# Patient Record
Sex: FEMALE | Race: WHITE | NOT HISPANIC OR LATINO | URBAN - METROPOLITAN AREA
[De-identification: names, ages, dates, MRNs, and addresses within clinical notes are randomized per-mention and may not be internally consistent; named-entity substitution may affect disease eponyms.]

---

## 2017-02-17 ENCOUNTER — EMERGENCY (EMERGENCY)
Facility: HOSPITAL | Age: 42
LOS: 1 days | Discharge: HOME | End: 2017-02-17
Attending: EMERGENCY MEDICINE | Admitting: EMERGENCY MEDICINE
Payer: COMMERCIAL

## 2017-02-17 VITALS
DIASTOLIC BLOOD PRESSURE: 74 MMHG | TEMPERATURE: 98 F | WEIGHT: 149.91 LBS | SYSTOLIC BLOOD PRESSURE: 112 MMHG | OXYGEN SATURATION: 97 % | RESPIRATION RATE: 18 BRPM | HEART RATE: 70 BPM | HEIGHT: 64 IN

## 2017-02-17 LAB
APPEARANCE UR: CLEAR — SIGNIFICANT CHANGE UP
BACTERIA # UR AUTO: ABNORMAL
BILIRUB UR-MCNC: NEGATIVE — SIGNIFICANT CHANGE UP
COLOR SPEC: YELLOW — SIGNIFICANT CHANGE UP
DIFF PNL FLD: ABNORMAL
EPI CELLS # UR: SIGNIFICANT CHANGE UP
GLUCOSE UR QL: NEGATIVE MG/DL — SIGNIFICANT CHANGE UP
HCG UR QL: NEGATIVE — SIGNIFICANT CHANGE UP
KETONES UR-MCNC: NEGATIVE — SIGNIFICANT CHANGE UP
LEUKOCYTE ESTERASE UR-ACNC: ABNORMAL
NITRITE UR-MCNC: POSITIVE
PH UR: 5 — SIGNIFICANT CHANGE UP (ref 4.8–8)
PROT UR-MCNC: 30 MG/DL
RBC CASTS # UR COMP ASSIST: ABNORMAL /HPF (ref 0–4)
SP GR SPEC: 1.02 — SIGNIFICANT CHANGE UP (ref 1.01–1.02)
UROBILINOGEN FLD QL: NEGATIVE MG/DL — SIGNIFICANT CHANGE UP
WBC UR QL: ABNORMAL

## 2017-02-17 PROCEDURE — 87086 URINE CULTURE/COLONY COUNT: CPT

## 2017-02-17 PROCEDURE — 81025 URINE PREGNANCY TEST: CPT

## 2017-02-17 PROCEDURE — 99283 EMERGENCY DEPT VISIT LOW MDM: CPT

## 2017-02-17 PROCEDURE — 81001 URINALYSIS AUTO W/SCOPE: CPT

## 2017-02-17 PROCEDURE — 87186 SC STD MICRODIL/AGAR DIL: CPT

## 2017-02-17 RX ORDER — ONDANSETRON 8 MG/1
1 TABLET, FILM COATED ORAL
Qty: 12 | Refills: 0 | OUTPATIENT
Start: 2017-02-17 | End: 2017-02-21

## 2017-02-17 RX ORDER — ONDANSETRON 8 MG/1
4 TABLET, FILM COATED ORAL ONCE
Qty: 0 | Refills: 0 | Status: COMPLETED | OUTPATIENT
Start: 2017-02-17 | End: 2017-02-17

## 2017-02-17 RX ORDER — CEPHALEXIN 500 MG
1 CAPSULE ORAL
Qty: 40 | Refills: 0 | OUTPATIENT
Start: 2017-02-17 | End: 2017-02-27

## 2017-02-17 RX ORDER — CEPHALEXIN 500 MG
500 CAPSULE ORAL ONCE
Qty: 0 | Refills: 0 | Status: COMPLETED | OUTPATIENT
Start: 2017-02-17 | End: 2017-02-17

## 2017-02-17 RX ORDER — IBUPROFEN 200 MG
600 TABLET ORAL ONCE
Qty: 0 | Refills: 0 | Status: DISCONTINUED | OUTPATIENT
Start: 2017-02-17 | End: 2017-02-17

## 2017-02-17 RX ORDER — IBUPROFEN 200 MG
400 TABLET ORAL ONCE
Qty: 0 | Refills: 0 | Status: COMPLETED | OUTPATIENT
Start: 2017-02-17 | End: 2017-02-17

## 2017-02-17 RX ORDER — PHENAZOPYRIDINE HCL 100 MG
100 TABLET ORAL ONCE
Qty: 0 | Refills: 0 | Status: COMPLETED | OUTPATIENT
Start: 2017-02-17 | End: 2017-02-17

## 2017-02-17 RX ADMIN — ONDANSETRON 4 MILLIGRAM(S): 8 TABLET, FILM COATED ORAL at 13:01

## 2017-02-17 RX ADMIN — Medication 400 MILLIGRAM(S): at 12:07

## 2017-02-17 RX ADMIN — Medication 400 MILLIGRAM(S): at 11:37

## 2017-02-17 RX ADMIN — Medication 100 MILLIGRAM(S): at 11:37

## 2017-02-17 RX ADMIN — Medication 500 MILLIGRAM(S): at 13:01

## 2017-02-17 NOTE — ED STATDOCS - NS ED MD SCRIBE ATTENDING SCRIBE SECTIONS
HISTORY OF PRESENT ILLNESS/REVIEW OF SYSTEMS/INTAKE ASSESSMENT/SCREENINGS/DISPOSITION/PAST MEDICAL/SURGICAL/SOCIAL HISTORY/PHYSICAL EXAM/HIV

## 2017-02-17 NOTE — ED STATDOCS - PROGRESS NOTE DETAILS
pt c/o left sided flank pain and nausea - denies vomiting - able to tolerate PO - pt also c/o difficulty breathing - pt is PERC negative  General: well appearing, NAD  +CVA tenderness, lungs CTA, Neuro: A&O x 3, no focal weaknesses  will treat for pyelo - advised pt to return to ED if unable to tolerate PO

## 2017-02-17 NOTE — ED STATDOCS - ATTENDING CONTRIBUTION TO CARE
I, Carline Schultz, performed the initial face to face bedside interview with this patient regarding history of present illness, review of symptoms and relevant past medical, social and family history.  I completed an independent physical examination.  I was the initial provider who evaluated this patient. I have signed out the follow up of any pending tests (i.e. labs, radiological studies) to the ACP.  I have communicated the patient’s plan of care and disposition with the ACP.  The history, relevant review of systems, past medical and surgical history, medical decision making, and physical examination was documented by the scribe in my presence and I attest to the accuracy of the documentation.

## 2017-02-17 NOTE — ED STATDOCS - OBJECTIVE STATEMENT
40 y/o F presents to the ED c/o fever, chills, nausea, cloudy urine, increased frequency, and left flank pain radiating to mid-back. Pt denies hematuria, vomiting,   Pt denies tobacco use. NKDA. LMP was a couple of days ago. No further complaints at this time.

## 2017-02-18 ENCOUNTER — EMERGENCY (EMERGENCY)
Facility: HOSPITAL | Age: 42
LOS: 0 days | Discharge: HOME | End: 2017-02-18
Attending: EMERGENCY MEDICINE | Admitting: EMERGENCY MEDICINE
Payer: MEDICAID

## 2017-02-18 VITALS
DIASTOLIC BLOOD PRESSURE: 104 MMHG | RESPIRATION RATE: 16 BRPM | SYSTOLIC BLOOD PRESSURE: 118 MMHG | TEMPERATURE: 99 F | OXYGEN SATURATION: 99 % | HEART RATE: 71 BPM

## 2017-02-18 VITALS — WEIGHT: 149.91 LBS

## 2017-02-18 DIAGNOSIS — J02.9 ACUTE PHARYNGITIS, UNSPECIFIED: ICD-10-CM

## 2017-02-18 DIAGNOSIS — R50.9 FEVER, UNSPECIFIED: ICD-10-CM

## 2017-02-18 DIAGNOSIS — R13.10 DYSPHAGIA, UNSPECIFIED: ICD-10-CM

## 2017-02-18 PROCEDURE — 99283 EMERGENCY DEPT VISIT LOW MDM: CPT

## 2017-02-18 RX ORDER — DEXAMETHASONE 0.5 MG/5ML
10 ELIXIR ORAL ONCE
Refills: 0 | Status: DISCONTINUED | OUTPATIENT
Start: 2017-02-18 | End: 2017-02-18

## 2017-02-18 RX ORDER — IBUPROFEN 200 MG
600 TABLET ORAL ONCE
Refills: 0 | Status: DISCONTINUED | OUTPATIENT
Start: 2017-02-18 | End: 2017-02-18

## 2017-02-18 NOTE — ED STATDOCS - NS ED MD SCRIBE ATTENDING SCRIBE SECTIONS
HISTORY OF PRESENT ILLNESS/REVIEW OF SYSTEMS/DISPOSITION/PAST MEDICAL/SURGICAL/SOCIAL HISTORY/VITAL SIGNS( Pullset)/PHYSICAL EXAM

## 2017-02-18 NOTE — ED STATDOCS - OBJECTIVE STATEMENT
40 y/o F presents to ED for evaluation of sore throat. Pt states she woke up this morning with a sore throat. She also reports an intermittent fever, chills. Pt also reports she is having difficulty swallowing which prompted her to seek evaluation. She states the sore throat has made it difficult to swallow. Pt denies n/v/d, HA, CP.

## 2017-02-18 NOTE — ED STATDOCS - ATTENDING CONTRIBUTION TO CARE
I, Kwesi Andrea, performed the initial face to face bedside interview with this patient regarding history of present illness, review of symptoms and relevant past medical, social and family history.  I completed an independent physical examination.  I was the initial provider who evaluated this patient. I have signed out the follow up of any pending tests (i.e. labs, radiological studies) to the ACP.  I have communicated the patient’s plan of care and disposition with the ACP.  The history, relevant review of systems, past medical and surgical history, medical decision making, and physical examination was documented by the scribe in my presence and I attest to the accuracy of the documentation.

## 2017-02-18 NOTE — ED STATDOCS - PROGRESS NOTE DETAILS
patient verbally threatening and abusive from initial evaluation; patient screaming  patient in no distress; breathing comfortably; no drooling no signs of distress, throat is clear. Patient is already on Keflex. Patient was seen by charge nurse Aakash. Will give one dose of decadron IM. patient verbally threatening and abusive from initial evaluation; patient screaming  patient in no distress; breathing comfortably; no drooling (Dr. Andrea)

## 2017-04-14 ENCOUNTER — EMERGENCY (EMERGENCY)
Facility: HOSPITAL | Age: 42
LOS: 0 days | Discharge: HOME | End: 2017-04-14
Attending: EMERGENCY MEDICINE | Admitting: EMERGENCY MEDICINE
Payer: MEDICAID

## 2017-04-14 VITALS
TEMPERATURE: 100 F | OXYGEN SATURATION: 100 % | HEART RATE: 92 BPM | RESPIRATION RATE: 16 BRPM | WEIGHT: 149.91 LBS | HEIGHT: 63 IN | DIASTOLIC BLOOD PRESSURE: 75 MMHG | SYSTOLIC BLOOD PRESSURE: 130 MMHG

## 2017-04-14 VITALS
HEART RATE: 89 BPM | TEMPERATURE: 98 F | RESPIRATION RATE: 17 BRPM | SYSTOLIC BLOOD PRESSURE: 97 MMHG | OXYGEN SATURATION: 100 % | DIASTOLIC BLOOD PRESSURE: 43 MMHG

## 2017-04-14 DIAGNOSIS — N12 TUBULO-INTERSTITIAL NEPHRITIS, NOT SPECIFIED AS ACUTE OR CHRONIC: ICD-10-CM

## 2017-04-14 DIAGNOSIS — R10.9 UNSPECIFIED ABDOMINAL PAIN: ICD-10-CM

## 2017-04-14 LAB
ALBUMIN SERPL ELPH-MCNC: 3.4 G/DL — SIGNIFICANT CHANGE UP (ref 3.3–5)
ALP SERPL-CCNC: 59 U/L — SIGNIFICANT CHANGE UP (ref 40–120)
ALT FLD-CCNC: 14 U/L — SIGNIFICANT CHANGE UP (ref 12–78)
ANION GAP SERPL CALC-SCNC: 9 MMOL/L — SIGNIFICANT CHANGE UP (ref 5–17)
APPEARANCE UR: CLEAR — SIGNIFICANT CHANGE UP
AST SERPL-CCNC: 8 U/L — LOW (ref 15–37)
BACTERIA # UR AUTO: (no result)
BASOPHILS # BLD AUTO: 0.1 K/UL — SIGNIFICANT CHANGE UP (ref 0–0.2)
BASOPHILS NFR BLD AUTO: 0.8 % — SIGNIFICANT CHANGE UP (ref 0–2)
BILIRUB SERPL-MCNC: 0.6 MG/DL — SIGNIFICANT CHANGE UP (ref 0.2–1.2)
BILIRUB UR-MCNC: NEGATIVE — SIGNIFICANT CHANGE UP
BUN SERPL-MCNC: 14 MG/DL — SIGNIFICANT CHANGE UP (ref 7–23)
CALCIUM SERPL-MCNC: 8.9 MG/DL — SIGNIFICANT CHANGE UP (ref 8.5–10.1)
CHLORIDE SERPL-SCNC: 102 MMOL/L — SIGNIFICANT CHANGE UP (ref 96–108)
CO2 SERPL-SCNC: 25 MMOL/L — SIGNIFICANT CHANGE UP (ref 22–31)
COLOR SPEC: YELLOW — SIGNIFICANT CHANGE UP
CREAT SERPL-MCNC: 1.06 MG/DL — SIGNIFICANT CHANGE UP (ref 0.5–1.3)
DIFF PNL FLD: (no result)
EOSINOPHIL # BLD AUTO: 0 K/UL — SIGNIFICANT CHANGE UP (ref 0–0.5)
EOSINOPHIL NFR BLD AUTO: 0.2 % — SIGNIFICANT CHANGE UP (ref 0–6)
EPI CELLS # UR: SIGNIFICANT CHANGE UP
GLUCOSE SERPL-MCNC: 125 MG/DL — HIGH (ref 70–99)
GLUCOSE UR QL: NEGATIVE MG/DL — SIGNIFICANT CHANGE UP
HCT VFR BLD CALC: 39 % — SIGNIFICANT CHANGE UP (ref 34.5–45)
HGB BLD-MCNC: 13.2 G/DL — SIGNIFICANT CHANGE UP (ref 11.5–15.5)
KETONES UR-MCNC: (no result)
LEUKOCYTE ESTERASE UR-ACNC: (no result)
LYMPHOCYTES # BLD AUTO: 1.8 K/UL — SIGNIFICANT CHANGE UP (ref 1–3.3)
LYMPHOCYTES # BLD AUTO: 15.2 % — SIGNIFICANT CHANGE UP (ref 13–44)
MCHC RBC-ENTMCNC: 26.7 PG — LOW (ref 27–34)
MCHC RBC-ENTMCNC: 34 GM/DL — SIGNIFICANT CHANGE UP (ref 32–36)
MCV RBC AUTO: 78.6 FL — LOW (ref 80–100)
MONOCYTES # BLD AUTO: 0.8 K/UL — SIGNIFICANT CHANGE UP (ref 0–0.9)
MONOCYTES NFR BLD AUTO: 7.2 % — SIGNIFICANT CHANGE UP (ref 2–14)
NEUTROPHILS # BLD AUTO: 9.1 K/UL — HIGH (ref 1.8–7.4)
NEUTROPHILS NFR BLD AUTO: 76.6 % — SIGNIFICANT CHANGE UP (ref 43–77)
NITRITE UR-MCNC: NEGATIVE — SIGNIFICANT CHANGE UP
PH UR: 6.5 — SIGNIFICANT CHANGE UP (ref 4.8–8)
PLATELET # BLD AUTO: 178 K/UL — SIGNIFICANT CHANGE UP (ref 150–400)
POTASSIUM SERPL-MCNC: 3.8 MMOL/L — SIGNIFICANT CHANGE UP (ref 3.5–5.3)
POTASSIUM SERPL-SCNC: 3.8 MMOL/L — SIGNIFICANT CHANGE UP (ref 3.5–5.3)
PROT SERPL-MCNC: 7.3 GM/DL — SIGNIFICANT CHANGE UP (ref 6–8.3)
PROT UR-MCNC: 30 MG/DL
RBC # BLD: 4.96 M/UL — SIGNIFICANT CHANGE UP (ref 3.8–5.2)
RBC # FLD: 12.3 % — SIGNIFICANT CHANGE UP (ref 10.3–14.5)
RBC CASTS # UR COMP ASSIST: (no result) /HPF (ref 0–4)
SODIUM SERPL-SCNC: 136 MMOL/L — SIGNIFICANT CHANGE UP (ref 135–145)
SP GR SPEC: 1.01 — SIGNIFICANT CHANGE UP (ref 1.01–1.02)
UROBILINOGEN FLD QL: NEGATIVE MG/DL — SIGNIFICANT CHANGE UP
WBC # BLD: 11.8 K/UL — HIGH (ref 3.8–10.5)
WBC # FLD AUTO: 11.8 K/UL — HIGH (ref 3.8–10.5)
WBC UR QL: (no result)

## 2017-04-14 PROCEDURE — 99283 EMERGENCY DEPT VISIT LOW MDM: CPT

## 2017-04-14 RX ORDER — KETOROLAC TROMETHAMINE 30 MG/ML
30 SYRINGE (ML) INJECTION ONCE
Refills: 0 | Status: DISCONTINUED | OUTPATIENT
Start: 2017-04-14 | End: 2017-04-14

## 2017-04-14 RX ORDER — MOXIFLOXACIN HYDROCHLORIDE TABLETS, 400 MG 400 MG/1
1 TABLET, FILM COATED ORAL
Qty: 20 | Refills: 0
Start: 2017-04-14 | End: 2017-04-24

## 2017-04-14 RX ORDER — ONDANSETRON 8 MG/1
4 TABLET, FILM COATED ORAL ONCE
Refills: 0 | Status: COMPLETED | OUTPATIENT
Start: 2017-04-14 | End: 2017-04-14

## 2017-04-14 RX ORDER — CIPROFLOXACIN LACTATE 400MG/40ML
500 VIAL (ML) INTRAVENOUS ONCE
Refills: 0 | Status: COMPLETED | OUTPATIENT
Start: 2017-04-14 | End: 2017-04-14

## 2017-04-14 RX ORDER — SODIUM CHLORIDE 9 MG/ML
1000 INJECTION INTRAMUSCULAR; INTRAVENOUS; SUBCUTANEOUS ONCE
Refills: 0 | Status: COMPLETED | OUTPATIENT
Start: 2017-04-14 | End: 2017-04-14

## 2017-04-14 RX ADMIN — Medication 30 MILLIGRAM(S): at 19:40

## 2017-04-14 RX ADMIN — SODIUM CHLORIDE 1000 MILLILITER(S): 9 INJECTION INTRAMUSCULAR; INTRAVENOUS; SUBCUTANEOUS at 20:34

## 2017-04-14 RX ADMIN — ONDANSETRON 4 MILLIGRAM(S): 8 TABLET, FILM COATED ORAL at 19:40

## 2017-04-14 RX ADMIN — SODIUM CHLORIDE 2000 MILLILITER(S): 9 INJECTION INTRAMUSCULAR; INTRAVENOUS; SUBCUTANEOUS at 19:40

## 2017-04-14 RX ADMIN — Medication 500 MILLIGRAM(S): at 21:59

## 2017-04-14 NOTE — ED STATDOCS - MEDICAL DECISION MAKING DETAILS
42 y/o F w flank pain, fever, cloudy urine, nausea without vomiting. +CVAT. Labs, IVF, pain control, reassess. Symptoms likely  pyelo. If patient symptoms improve and able to tolerate PO- will dc home.

## 2017-04-14 NOTE — ED PROVIDER NOTE - OBJECTIVE STATEMENT
41F presents with left flank pain. Pain started a few days ago. left flank radiating to groin. + fevers to 101. nausea no vomiting. + cloudy urine. + headache. has had this before and it way pyelonephritis. patient cannot get comfortable. Now pain 8/10. didn't take anything for the pain

## 2017-04-14 NOTE — ED PROVIDER NOTE - NS ED ROS FT
Constitutional: + fever or chills  Eyes: No visual changes  HEENT: No throat pain  CV: No chest pain  Resp: No SOB no cough  GI: + abd pain, + nausea no vomiting  : + dysuria  MSK: + back pain  Skin: No rash  Neuro: + headache

## 2017-04-14 NOTE — ED STATDOCS - ATTENDING CONTRIBUTION TO CARE
I have seen and examined patient with resident. Agree with plan. I personally saw the patient with the NP, and completed the key components of the history and physical exam. I then discussed the management plan with the NP. I personally saw the patient with the NP, and completed the key components of the history and physical exam. I then discussed the management plan with the NP.  40 y/o F presenting w flank pain. appears moderately uncomfortable, b/l cvat, soft abdomen. labs, ua, abx as needed. reasess for admission

## 2017-04-14 NOTE — ED ADULT NURSE NOTE - CHPI ED SYMPTOMS NEG
no nausea/no weakness/no decreased eating/drinking/no chills/no numbness/no dizziness/no tingling/no vomiting

## 2017-04-14 NOTE — ED PROVIDER NOTE - PHYSICAL EXAMINATION
Constitutional: moderate distress  Eyes: PERRLA EOMI  Head: Normocephalic atraumatic  Cardiac: regular rate   Resp: Lungs CTAB  GI: ttp in llq severe left cvat  Neuro: CN2-12 intact  Skin: No rashes

## 2017-04-14 NOTE — ED PROVIDER NOTE - MEDICAL DECISION MAKING DETAILS
41F presents with left flank pain radiating to groin fever and cloudy urine. Pt very uncomfortable. Will obtain labs ua culture ct abd pain control and reassess

## 2017-04-14 NOTE — ED STATDOCS - CARE PLAN
Principal Discharge DX:	Flank pain Principal Discharge DX:	Flank pain  Secondary Diagnosis:	Pyelonephritis

## 2017-09-12 ENCOUNTER — EMERGENCY (EMERGENCY)
Facility: HOSPITAL | Age: 42
LOS: 1 days | End: 2017-09-12
Payer: SELF-PAY

## 2017-09-12 PROCEDURE — 99284 EMERGENCY DEPT VISIT MOD MDM: CPT

## 2017-09-12 PROCEDURE — 71020: CPT | Mod: 26

## 2017-09-23 ENCOUNTER — EMERGENCY (EMERGENCY)
Facility: HOSPITAL | Age: 42
LOS: 1 days | End: 2017-09-23
Payer: SELF-PAY

## 2017-09-23 PROCEDURE — 99284 EMERGENCY DEPT VISIT MOD MDM: CPT

## 2018-09-11 ENCOUNTER — EMERGENCY (EMERGENCY)
Facility: HOSPITAL | Age: 43
LOS: 0 days | Discharge: HOME | End: 2018-09-11
Attending: EMERGENCY MEDICINE | Admitting: EMERGENCY MEDICINE
Payer: MEDICAID

## 2018-09-11 VITALS — HEIGHT: 63 IN | WEIGHT: 139.99 LBS

## 2018-09-11 VITALS
SYSTOLIC BLOOD PRESSURE: 117 MMHG | DIASTOLIC BLOOD PRESSURE: 92 MMHG | HEART RATE: 68 BPM | RESPIRATION RATE: 15 BRPM | OXYGEN SATURATION: 100 % | TEMPERATURE: 98 F

## 2018-09-11 DIAGNOSIS — R05 COUGH: ICD-10-CM

## 2018-09-11 DIAGNOSIS — J06.9 ACUTE UPPER RESPIRATORY INFECTION, UNSPECIFIED: ICD-10-CM

## 2018-09-11 DIAGNOSIS — G89.29 OTHER CHRONIC PAIN: ICD-10-CM

## 2018-09-11 DIAGNOSIS — M25.532 PAIN IN LEFT WRIST: ICD-10-CM

## 2018-09-11 DIAGNOSIS — F42.4 EXCORIATION (SKIN-PICKING) DISORDER: ICD-10-CM

## 2018-09-11 PROCEDURE — 99285 EMERGENCY DEPT VISIT HI MDM: CPT

## 2018-09-11 PROCEDURE — 29125 APPL SHORT ARM SPLINT STATIC: CPT | Mod: LT

## 2018-09-11 RX ORDER — MUPIROCIN 20 MG/G
1 OINTMENT TOPICAL ONCE
Refills: 0 | Status: COMPLETED | OUTPATIENT
Start: 2018-09-11 | End: 2018-09-11

## 2018-09-11 RX ORDER — DEXAMETHASONE 0.5 MG/5ML
10 ELIXIR ORAL ONCE
Refills: 0 | Status: COMPLETED | OUTPATIENT
Start: 2018-09-11 | End: 2018-09-11

## 2018-09-11 RX ADMIN — Medication 10 MILLIGRAM(S): at 16:18

## 2018-09-11 RX ADMIN — MUPIROCIN 1 APPLICATION(S): 20 OINTMENT TOPICAL at 16:18

## 2018-09-11 NOTE — ED STATDOCS - MUSCULOSKELETAL, MLM
range of motion is not limited and there is no muscle tenderness. No bony tenderness to left wrist/hand. Left hand tapping over proximal hand causes parasthenia in second and third.

## 2018-09-11 NOTE — ED ADULT NURSE NOTE - NSFALLRSKASSESSDT_ED_ALL_ED
How Severe Are Your Bumps?: moderate Have Your Bumps Been Treated?: not been treated Is This A New Presentation, Or A Follow-Up?: Bump 11-Sep-2018 16:21

## 2018-09-11 NOTE — ED STATDOCS - NS_ ATTENDINGSCRIBEDETAILS _ED_A_ED_FT
Aakash Thompson DO (Attending): The history, relevant review of systems, past medical and surgical history, medical decision making, and physical examination was documented by the scribe in my presence and I attest to the accuracy of the documentation.

## 2018-09-11 NOTE — ED STATDOCS - OBJECTIVE STATEMENT
41 y/o female with a PMHx of UTI presents to the ED c/o cold symptoms starting yesterday.  +HA. +wrist pain, +cough. Denies fever, chills.

## 2018-09-11 NOTE — ED STATDOCS - CARE PLAN
Principal Discharge DX:	Upper respiratory tract infection, unspecified type  Secondary Diagnosis:	Excoriation  Secondary Diagnosis:	Wrist pain, chronic, left

## 2018-09-11 NOTE — ED STATDOCS - MEDICAL DECISION MAKING DETAILS
URI likely viral with post nasal drip. Plan for supportive care. Multiple excoriations from bite to legs. No active cellulitis. Plan for topical Bactroban 2 twice daily x1 week and wrist pain possible carpel tunnel, provide wrist splint, follow up.

## 2018-09-11 NOTE — ED STATDOCS - ATTENDING CONTRIBUTION TO CARE
I, Aakash Thompson DO,  performed the initial face to face bedside interview with this patient regarding history of present illness, review of symptoms and relevant past medical, social and family history.  I completed an independent physical examination.  I was the initial provider who evaluated this patient. I have signed out the follow up of any pending tests (i.e. labs, radiological studies) to the ACP.  I have communicated the patient’s plan of care and disposition with the ACP.

## 2018-09-11 NOTE — ED STATDOCS - SKIN, MLM
skin normal color for race, warm, dry and intact. 1 cm round excoriated lesion to Left lower and left anterior lower leg.

## 2018-09-11 NOTE — ED STATDOCS - ENMT, MLM
Nasal mucosa clear.  Mouth with normal mucosa  Throat has no vesicles, no oropharyngeal exudates and uvula is midline. Post nasal drip.

## 2018-09-11 NOTE — ED STATDOCS - PROGRESS NOTE DETAILS
Patient seen and evaluated, ED attending note and orders reviewed, will continue with patient follow up and care -Haylee Muñoz PA-C decadron given for URI sx, bactroban applied to LE sores, pt given tube to take home (no medical insurance).  L wrist splinted, advise pt to use splint as needed for carpal tunnel sx.  Plan to d/c home f/u PMD.  Pt agreeable to d/c and plan of care, all questions answered, return precautions given  Haylee Muñoz PA-C

## 2018-09-11 NOTE — ED ADULT NURSE NOTE - NSIMPLEMENTINTERV_GEN_ALL_ED
Implemented All Universal Safety Interventions:  [place] to call system. Call bell, personal items and telephone within reach. Instruct patient to call for assistance. Room bathroom lighting operational. Non-slip footwear when patient is off stretcher. Physically safe environment: no spills, clutter or unnecessary equipment. Stretcher in lowest position, wheels locked, appropriate side rails in place.

## 2018-10-21 ENCOUNTER — EMERGENCY (EMERGENCY)
Facility: HOSPITAL | Age: 43
LOS: 1 days | End: 2018-10-21
Payer: MEDICAID

## 2018-10-21 PROCEDURE — 99283 EMERGENCY DEPT VISIT LOW MDM: CPT

## 2019-03-28 ENCOUNTER — EMERGENCY (EMERGENCY)
Facility: HOSPITAL | Age: 44
LOS: 1 days | Discharge: HOME | End: 2019-03-28
Attending: EMERGENCY MEDICINE
Payer: COMMERCIAL

## 2019-03-28 VITALS
HEIGHT: 64 IN | TEMPERATURE: 97 F | RESPIRATION RATE: 18 BRPM | DIASTOLIC BLOOD PRESSURE: 86 MMHG | WEIGHT: 139.99 LBS | SYSTOLIC BLOOD PRESSURE: 127 MMHG | HEART RATE: 81 BPM | OXYGEN SATURATION: 98 %

## 2019-03-28 PROCEDURE — 71046 X-RAY EXAM CHEST 2 VIEWS: CPT

## 2019-03-28 PROCEDURE — 71046 X-RAY EXAM CHEST 2 VIEWS: CPT | Mod: 26

## 2019-03-28 PROCEDURE — 99283 EMERGENCY DEPT VISIT LOW MDM: CPT

## 2019-03-28 PROCEDURE — 99283 EMERGENCY DEPT VISIT LOW MDM: CPT | Mod: 25

## 2019-03-28 RX ORDER — ACETAMINOPHEN 500 MG
975 TABLET ORAL ONCE
Qty: 0 | Refills: 0 | Status: COMPLETED | OUTPATIENT
Start: 2019-03-28 | End: 2019-03-28

## 2019-03-28 RX ORDER — AZITHROMYCIN 500 MG/1
1 TABLET, FILM COATED ORAL
Qty: 1 | Refills: 0 | OUTPATIENT
Start: 2019-03-28

## 2019-03-28 RX ADMIN — Medication 975 MILLIGRAM(S): at 03:45

## 2019-03-28 NOTE — ED PROVIDER NOTE - PHYSICAL EXAMINATION
Vital signs noted, see flowsheet.  General: Well nourished/developed. In no acute distress, well appearing and non-toxic.  HEENT: Head normocephalic/atraumatic.  Moist mucous membranes. Auricles/tragi/mastoid non-tender b/l.  TM and ear canal normal b/l without erythema or bulging.  Conjunctiva and sclera clear b/l, EOMI, no ocular redness, discharge or swelling. No nasal discharge/rhinorrhea, no sinus tenderness, no nasal inflammation.  Mouth and pharynx with normal mucosa, PHARYNGEAL ERYTHEMA no exudate or vesicles. Uvula midline. No PTA.   Neck: Soft and supple, full ROM without pain.  Cardiac: Regular rate and rhythm. +S1/S2. Peripheral pulses 2+ and symmetric b/l.  Respiratory: Speaking in full sentences, no evidence of respiratory distress. Lungs clear to ascultation b/l, no wheezes/rhonchi/rales/stridor.   Abdomen: Soft, non-tender, non-distended. No guarding or rebound tenderness. No suprapubic tenderness.  Back: Spine midline and non-tender. No CVAT.  Skin: Normal color for race, no evidence of rash, cyanosis or jaundice.   Lymph: No cervical lymphadenopathy  Neuro: Awake, alert and oriented to person/place/time/situation. Moves all extremities spontaneously and symmetrically.  No focal deficits

## 2019-03-28 NOTE — ED PROVIDER NOTE - CHPI ED SYMPTOMS NEG
no rash/no decreased eating/drinking/no diarrhea/no vomiting/no headache/no abdominal pain/no shortness of breath

## 2019-03-28 NOTE — ED PROVIDER NOTE - OBJECTIVE STATEMENT
44 y/o F with no PMHx presents to ED c/o cough with yellow phlegm, runny nose, chills, subjective fever and body aches x 1 week. Pt reports she took no medications. Pt denies sick contact but states she lives with multiple roommates. 42 y/o F with no PMHx presents to ED c/o cough with yellow phlegm, runny nose, chills, subjective fever and body aches x 1 week. Pt reports she took no medications. Pt denies sick contact but states she lives with multiple roommates.  Pt has no other acute complaints at this time.  Pt is eating and drinking normally at home, having normal BM and urine output. LMP 2 wks ago, denies chance of pregnancy.

## 2019-03-28 NOTE — ED ADULT NURSE NOTE - OBJECTIVE STATEMENT
Pt A&Ox4 c/o back pain/ generalized body aches at this time. Pt resting comfortably, VSS, no signs of distress at this time.

## 2019-03-28 NOTE — ED PROVIDER NOTE - PROGRESS NOTE DETAILS
STEPHANIE Kebede NOTE: Reviewed XR with Dr. Ferrell, no acute disease process or infiltrate, likely viral, afebrile, will d/c home with PMD f/u

## 2019-03-28 NOTE — ED PROVIDER NOTE - CARE PLAN
Principal Discharge DX:	Cough  Secondary Diagnosis:	Body aches  Secondary Diagnosis:	Nasal congestion

## 2019-03-28 NOTE — ED ADULT NURSE NOTE - NSIMPLEMENTINTERV_GEN_ALL_ED
Implemented All Universal Safety Interventions:  [place] to call system. Call bell, personal items and telephone within reach. Instruct patient to call for assistance. Room bathroom lighting operational. Non-slip footwear when patient is off stretcher. Physically safe environment: no spills, clutter or unnecessary equipment. Stretcher in lowest position, wheels locked, appropriate side rails in place.

## 2019-03-28 NOTE — ED PROVIDER NOTE - ATTENDING CONTRIBUTION TO CARE
I, Neeraj Ferrell, performed a face to face bedside interview with this patient regarding history of present illness, review of symptoms and relevant past medical, social and family history.  I completed an independent physical examination. I have communicated the patient’s plan of care and disposition with the ACP.      42 y/o F  c/o cough with yellow phlegm, runny nose, chills, subjective fever and body aches x 1 week. Pt reports she took no medications. Pt denies sick contacts; pe awake in nad heent ncat throat clear chest cta abd soft  neuro nonfocal; dx cough

## 2019-03-28 NOTE — ED PROVIDER NOTE - CLINICAL SUMMARY MEDICAL DECISION MAKING FREE TEXT BOX
44 y/o F with viral like symptoms, cough, will do CXR eval for pna  -CXR, tylenol  -Follow up and re-eval

## 2019-03-28 NOTE — ED PROVIDER NOTE - CCCP TRG CHIEF CMPLNT
"Subjective   Tba Triplett is a 13 y.o. male who presents to the office complaining of sore throat \"for a couple of years.\"  Mother present and states that child has been seen at [facility] a couple of times and diagnosed with strep.  Each ear has had two sets of tubes.  Did use \"Flonase a long time ago\" but it burned.    History of Present Illness     The following portions of the patient's history were reviewed and updated as appropriate: allergies, current medications, past family history, past medical history, past social history, past surgical history and problem list.    Review of Systems   Constitutional: Negative for chills, fatigue and fever.   HENT: Positive for sore throat. Negative for congestion, sneezing and trouble swallowing.    Eyes: Negative for visual disturbance.   Respiratory: Negative for cough, chest tightness, shortness of breath and wheezing.    Cardiovascular: Negative for chest pain, palpitations and leg swelling.   Gastrointestinal: Negative for abdominal pain, constipation, diarrhea, nausea and vomiting.   Genitourinary: Negative for dysuria, frequency and urgency.   Musculoskeletal: Negative for neck pain.   Skin: Negative for rash.   Neurological: Negative for dizziness, weakness and headaches.   Psychiatric/Behavioral:        In the past two weeks the pt has not felt down, depressed, hopeless or lost interest in doing things   All other systems reviewed and are negative.      Objective   Physical Exam   Constitutional: He is oriented to person, place, and time. He appears well-developed and well-nourished. He is cooperative.  Non-toxic appearance. He does not have a sickly appearance. He does not appear ill.   HENT:   Head: Normocephalic and atraumatic.   Right Ear: External ear normal.   Left Ear: External ear normal.   Nose: Nose normal.   Mouth/Throat: Uvula is midline, oropharynx is clear and moist and mucous membranes are normal.   PND with cobblestoning   Eyes: Conjunctivae, " EOM and lids are normal. Pupils are equal, round, and reactive to light. Right eye exhibits no discharge. Left eye exhibits no discharge. No scleral icterus.   Neck: Trachea normal, normal range of motion and phonation normal. Neck supple. No thyromegaly present.   Cardiovascular: Normal rate, regular rhythm, normal heart sounds and intact distal pulses.  Exam reveals no gallop and no friction rub.    No murmur heard.  Pulmonary/Chest: Effort normal and breath sounds normal. No respiratory distress. He has no wheezes. He has no rales.   Abdominal: Soft. Normal appearance and bowel sounds are normal. He exhibits no distension. There is no tenderness. There is no rebound and no guarding.   Musculoskeletal: Normal range of motion. He exhibits no edema.   Lymphadenopathy:     He has no cervical adenopathy.   Neurological: He is alert and oriented to person, place, and time. No cranial nerve deficit. GCS eye subscore is 4. GCS verbal subscore is 5. GCS motor subscore is 6.   Skin: Skin is warm, dry and intact. No rash noted.   Psychiatric: He has a normal mood and affect. His behavior is normal. Judgment and thought content normal.   Nursing note and vitals reviewed.      Assessment/Plan   Tab was seen today for sore throat.    Diagnoses and all orders for this visit:    Allergic rhinitis, unspecified chronicity, unspecified seasonality, unspecified trigger  Comments:  with cobblestoning    Other orders  -     mometasone (NASONEX) 50 MCG/ACT nasal spray; 2 sprays into each nostril Daily.  -     loratadine ([facility] LORATADINE) 10 MG tablet; Take 1 tablet by mouth Daily.    Encouraged to cough and deep breathe, clear nasal secretions often, good handwashing.  No smoke exposure.  ENT referral has been made to [facility].          cold symptoms

## 2019-04-10 ENCOUNTER — EMERGENCY (EMERGENCY)
Facility: HOSPITAL | Age: 44
LOS: 1 days | End: 2019-04-10
Attending: EMERGENCY MEDICINE
Payer: COMMERCIAL

## 2019-04-10 VITALS
WEIGHT: 139.99 LBS | TEMPERATURE: 98 F | HEART RATE: 73 BPM | DIASTOLIC BLOOD PRESSURE: 60 MMHG | RESPIRATION RATE: 18 BRPM | OXYGEN SATURATION: 99 % | HEIGHT: 63 IN | SYSTOLIC BLOOD PRESSURE: 106 MMHG

## 2019-04-10 PROCEDURE — 99283 EMERGENCY DEPT VISIT LOW MDM: CPT | Mod: 25

## 2019-04-10 PROCEDURE — 99283 EMERGENCY DEPT VISIT LOW MDM: CPT

## 2019-04-10 RX ORDER — IBUPROFEN 200 MG
600 TABLET ORAL ONCE
Qty: 0 | Refills: 0 | Status: DISCONTINUED | OUTPATIENT
Start: 2019-04-10 | End: 2019-04-15

## 2019-04-10 RX ORDER — ACETAMINOPHEN 500 MG
650 TABLET ORAL ONCE
Qty: 0 | Refills: 0 | Status: DISCONTINUED | OUTPATIENT
Start: 2019-04-10 | End: 2019-04-15

## 2019-04-10 NOTE — ED ADULT NURSE NOTE - NS ED NURSE ELOPE COMMENTS
Pt not found or seen for more than 30 minutes, no one saw pt leave.  Searched in all of ER, did not find pt.

## 2019-04-10 NOTE — ED ADULT NURSE NOTE - NSIMPLEMENTINTERV_GEN_ALL_ED
Implemented All Universal Safety Interventions:  [place] to call system. Call bell, personal items and telephone within reach. Instruct patient to call for assistance. Room bathroom lighting operational. Non-slip footwear when patient is off stretcher. Physically safe environment: no spills, clutter or unnecessary equipment. Stretcher in lowest position, wheels locked, appropriate side rails in place.

## 2019-04-10 NOTE — ED PROVIDER NOTE - CROS ED ENMT ALL NEG
IVF infusing well    Patient tolerating neb tx well     Jemal Michele, RN  01/13/18 1066 negative...

## 2019-04-10 NOTE — ED ADULT NURSE REASSESSMENT NOTE - NS ED NURSE REASSESS COMMENT FT1
Pt received @0730, sleeping, easly arousable, c/o generalized body aches and cough.  Pt has not been seen by ED MD yet.  Will continue to monitor.

## 2019-04-10 NOTE — ED PROVIDER NOTE - OBJECTIVE STATEMENT
43 year old female presents with 2 days of non-productive cough, congestion and chills. Sx are consatmt diffuse, no alleviating/exacerbating fcators. No associated feveers, dysuria, hematuria, abd pain, vomitign, diarrrhea.

## 2019-05-07 ENCOUNTER — EMERGENCY (EMERGENCY)
Facility: HOSPITAL | Age: 44
LOS: 1 days | Discharge: HOME | End: 2019-05-07
Attending: EMERGENCY MEDICINE
Payer: COMMERCIAL

## 2019-05-07 VITALS
DIASTOLIC BLOOD PRESSURE: 75 MMHG | WEIGHT: 139.99 LBS | OXYGEN SATURATION: 100 % | HEIGHT: 63 IN | RESPIRATION RATE: 20 BRPM | SYSTOLIC BLOOD PRESSURE: 155 MMHG | HEART RATE: 60 BPM | TEMPERATURE: 98 F

## 2019-05-07 PROCEDURE — 90471 IMMUNIZATION ADMIN: CPT

## 2019-05-07 PROCEDURE — 99283 EMERGENCY DEPT VISIT LOW MDM: CPT | Mod: 25

## 2019-05-07 PROCEDURE — 99283 EMERGENCY DEPT VISIT LOW MDM: CPT

## 2019-05-07 PROCEDURE — 90715 TDAP VACCINE 7 YRS/> IM: CPT

## 2019-05-07 PROCEDURE — 96372 THER/PROPH/DIAG INJ SC/IM: CPT

## 2019-05-07 RX ORDER — OFLOXACIN 0.3 %
1 DROPS OPHTHALMIC (EYE) ONCE
Qty: 0 | Refills: 0 | Status: COMPLETED | OUTPATIENT
Start: 2019-05-07 | End: 2019-05-07

## 2019-05-07 RX ORDER — TETANUS TOXOID, REDUCED DIPHTHERIA TOXOID AND ACELLULAR PERTUSSIS VACCINE, ADSORBED 5; 2.5; 8; 8; 2.5 [IU]/.5ML; [IU]/.5ML; UG/.5ML; UG/.5ML; UG/.5ML
0.5 SUSPENSION INTRAMUSCULAR ONCE
Qty: 0 | Refills: 0 | Status: COMPLETED | OUTPATIENT
Start: 2019-05-07 | End: 2019-05-07

## 2019-05-07 RX ORDER — ERYTHROMYCIN BASE 5 MG/GRAM
1 OINTMENT (GRAM) OPHTHALMIC (EYE) ONCE
Qty: 0 | Refills: 0 | Status: COMPLETED | OUTPATIENT
Start: 2019-05-07 | End: 2019-05-07

## 2019-05-07 RX ORDER — KETOROLAC TROMETHAMINE 30 MG/ML
30 SYRINGE (ML) INJECTION ONCE
Qty: 0 | Refills: 0 | Status: DISCONTINUED | OUTPATIENT
Start: 2019-05-07 | End: 2019-05-07

## 2019-05-07 RX ADMIN — Medication 30 MILLIGRAM(S): at 16:59

## 2019-05-07 RX ADMIN — Medication 1 DROP(S): at 17:42

## 2019-05-07 RX ADMIN — Medication 1 APPLICATION(S): at 17:42

## 2019-05-07 RX ADMIN — TETANUS TOXOID, REDUCED DIPHTHERIA TOXOID AND ACELLULAR PERTUSSIS VACCINE, ADSORBED 0.5 MILLILITER(S): 5; 2.5; 8; 8; 2.5 SUSPENSION INTRAMUSCULAR at 17:43

## 2019-05-07 NOTE — ED ADULT TRIAGE NOTE - CHIEF COMPLAINT QUOTE
Patient arrived to ED today with c/o redness to her eyes, itchiness to her eyes for the past couple of days.  Patient has pain to her eyes also.

## 2019-05-07 NOTE — ED ADULT NURSE NOTE - NSIMPLEMENTINTERV_GEN_ALL_ED
Implemented All Universal Safety Interventions:  [place] to call system. Call bell, personal items and telephone within reach. Instruct patient to call for assistance. Room bathroom lighting operational. Non-slip footwear when patient is off stretcher. Physically safe environment: no spills, clutter or unnecessary equipment. Stretcher in lowest position, wheels locked, appropriate side rails in place.

## 2019-05-07 NOTE — ED STATDOCS - ATTENDING CONTRIBUTION TO CARE
I, Dl Brannon, performed the initial face to face bedside interview with this patient regarding history of present illness, review of symptoms and relevant past medical, social and family history.  I completed an independent physical examination.  I was the initial provider who evaluated this patient. I have signed out the follow up of any pending tests (i.e. labs, radiological studies) to the ACP.  I have communicated the patient’s plan of care and disposition with the ACP.

## 2019-05-07 NOTE — ED STATDOCS - PROGRESS NOTE DETAILS
HPI and intake orders reviewed, patient appears to be unkempt, has bag of belongings at bedside, reports b/l eye woke up with redness and pain and sensitivity to light x 1 day.  She notes some trace discharge to eyelashes.  She notes no FB sensation, denies any trauma to b/l eyes, has not followed up with opthomologist due to lack of transportation states "has no one to take her there."  Old charts reviewed has been seen in past for eye issues and was given antibiotic drops for corneal lesions/abrasions.  Patient declined performing visual acuity secondary to lack of glasses or use of contacts states "cant see shit."  She denies any fevers, chills, n/v/d or any sick contacts, recent travel or rashes, cough, runny nose, abdominal pain. woods lamp preformed no e/o uptake with florscene stain, patient screaming at bedside when applied tetracaine, at bedside MD Brannon, eyelids everted no e/o FB, conjunctiva +injected, no e/o hyphema, EOMS intact, PEERLA.  Tonopen performed x 3 pressure readings 18 in R eye, 20, in L eye.  Informed patient will treat with ofloxacin drops and SAUL oint, will update tetanus and stressed importance of follow up with optho.

## 2019-05-07 NOTE — ED ADULT NURSE NOTE - OBJECTIVE STATEMENT
Pt is here with c/o ingrid eye pain, itching and redness.  P/s she woke this morning with eye pain, states they have been itchy for a couple of days.  Pt is only able to keep her eyes open for a brief time due to pain and tearing.  Pt denies having allergies, no other rash or itching noted.

## 2019-05-07 NOTE — ED STATDOCS - PHYSICAL EXAMINATION
VITAL SIGNS: I have reviewed nursing notes and confirm.  CONSTITUTIONAL: Well-developed; well-nourished; in no acute distress.  SKIN: Skin exam is warm and dry, no acute rash.  HEAD: Normocephalic; atraumatic.  EYES: PERRL, EOM intact; SENAIT injection. Mild swelling of upper and lower eyelids, with no surrounding erythema.   ENT: No nasal discharge; airway clear. Throat clear.  NECK: Supple; non tender.    CARD: S1, S2 normal; no murmurs, gallops, or rubs. Regular rate and rhythm.  RESP: No wheezes,  no rales or rhonchi.   ABD:  soft; non-distended; non-tender;   EXT: Normal ROM. No clubbing, cyanosis or edema.  NEURO: Alert, oriented. Grossly unremarkable. No focal deficits. no facial droop, moves all extremities,  no pronator drift, finger-to-nose wnl, normal gait   PSYCH: Cooperative, appropriate. VITAL SIGNS: I have reviewed nursing notes and confirm.  CONSTITUTIONAL: Well-developed; well-nourished; in no acute distress.  SKIN: Skin exam is warm and dry, no acute rash.  HEAD: Normocephalic; atraumatic.  EYES: PERRL, EOM intact; (+) SENAIT injection. Mild swelling of upper and lower eyelids, with no surrounding erythema.   ENT: No nasal discharge; airway clear. Throat clear.  NECK: Supple; non tender.    CARD: S1, S2 normal; no murmurs, gallops, or rubs. Regular rate and rhythm.  RESP: No wheezes,  no rales or rhonchi.   ABD:  soft; non-distended; non-tender;   EXT: Normal ROM. No clubbing, cyanosis or edema.  NEURO: Alert, oriented. Grossly unremarkable. No focal deficits. no facial droop, moves all extremities,    PSYCH: Cooperative, appropriate.

## 2019-05-07 NOTE — ED STATDOCS - CLINICAL SUMMARY MEDICAL DECISION MAKING FREE TEXT BOX
Pt hx of contact use, presents SENAIT injection, painful, teary eyes. Will perform fluorescein drops, to evaluate for corneal abrasions and ulcers. Administer Toradol 30mg IV for pain. Pt will need to go home with abx drops, and opthalmology followup.

## 2019-05-07 NOTE — ED STATDOCS - CARE PROVIDER_API CALL
Jose Ahn (MD; PhD)  Ophthalmology  [address]  [address]  [place] [address]  Phone: [phone]  Fax: [phone]  Follow Up Time:

## 2019-05-07 NOTE — ED STATDOCS - NS ED ROS FT
Review of Systems  •	CONSTITUTIONAL - no  fever, no diaphoresis, no weight change  •	SKIN - no rash  •	HEMATOLOGIC - no bleeding, no bruising  •	EYES - (+) eye pain, eye redness,  blurry vision, photophobia   •	ENT - no change in hearing, no pain  •	RESPIRATORY - no shortness of breath, no cough  •	CARDIAC - no chest pain, no palpitations  •	GI - no abd pain, no nausea, no vomiting, no diarrhea, no constipation, no bleeding  •	GENITO-URINARY - no discharge, no dysuria; no hematuria,   •	ENDO - no polydypsia, no polyurea, no heat/no cold intolerance  •	MUSCULOSKELETAL - no joint pain, no swelling, no redness  •	NEUROLOGIC - no weakness, no headache, no anesthesia, no paresthesias  •	PSYCH - no anxiety, non suicidal, non homicidal, no hallucination, no depression

## 2019-05-07 NOTE — ED CLERICAL - CLERICAL COMMENTS
[facility] called for transport.. res #[id number] [facility] called for transport.. res #[id number]//[facility] sent [facility] for patient (arrived at 2025) and patient does not qualify for ambulance.  Re-called & had transport rearranged correctly w/ new res # [id number]

## 2019-05-09 ENCOUNTER — EMERGENCY (EMERGENCY)
Facility: HOSPITAL | Age: 44
LOS: 1 days | End: 2019-05-09
Admitting: EMERGENCY MEDICINE
Payer: MEDICAID

## 2019-05-09 PROCEDURE — 99283 EMERGENCY DEPT VISIT LOW MDM: CPT

## 2019-05-24 ENCOUNTER — EMERGENCY (EMERGENCY)
Facility: HOSPITAL | Age: 44
LOS: 1 days | End: 2019-05-24
Admitting: EMERGENCY MEDICINE
Payer: MEDICAID

## 2019-05-24 PROCEDURE — 99285 EMERGENCY DEPT VISIT HI MDM: CPT

## 2019-05-24 PROCEDURE — 74176 CT ABD & PELVIS W/O CONTRAST: CPT | Mod: 26

## 2019-06-23 ENCOUNTER — EMERGENCY (EMERGENCY)
Facility: HOSPITAL | Age: 44
LOS: 1 days | Discharge: HOME | End: 2019-06-23
Attending: STUDENT IN AN ORGANIZED HEALTH CARE EDUCATION/TRAINING PROGRAM
Payer: COMMERCIAL

## 2019-06-23 VITALS
SYSTOLIC BLOOD PRESSURE: 90 MMHG | HEART RATE: 66 BPM | OXYGEN SATURATION: 100 % | RESPIRATION RATE: 18 BRPM | WEIGHT: 139.99 LBS | TEMPERATURE: 98 F | HEIGHT: 64 IN | DIASTOLIC BLOOD PRESSURE: 60 MMHG

## 2019-06-23 PROCEDURE — 99283 EMERGENCY DEPT VISIT LOW MDM: CPT | Mod: 25

## 2019-06-24 VITALS
TEMPERATURE: 97 F | DIASTOLIC BLOOD PRESSURE: 60 MMHG | SYSTOLIC BLOOD PRESSURE: 95 MMHG | RESPIRATION RATE: 18 BRPM | HEART RATE: 55 BPM | OXYGEN SATURATION: 100 %

## 2019-06-24 PROCEDURE — 73562 X-RAY EXAM OF KNEE 3: CPT | Mod: 26,RT

## 2019-06-24 PROCEDURE — 99283 EMERGENCY DEPT VISIT LOW MDM: CPT

## 2019-06-24 PROCEDURE — 73562 X-RAY EXAM OF KNEE 3: CPT

## 2019-06-24 RX ORDER — IBUPROFEN 200 MG
600 TABLET ORAL ONCE
Refills: 0 | Status: COMPLETED | OUTPATIENT
Start: 2019-06-24 | End: 2019-06-24

## 2019-06-24 RX ADMIN — Medication 600 MILLIGRAM(S): at 03:20

## 2019-06-24 NOTE — ED ADULT NURSE NOTE - NSIMPLEMENTINTERV_GEN_ALL_ED
Implemented All Universal Safety Interventions:  [place] to call system. Call bell, personal items and telephone within reach. Instruct patient to call for assistance. Room bathroom lighting operational. Non-slip footwear when patient is off stretcher. Physically safe environment: no spills, clutter or unnecessary equipment. Stretcher in lowest position, wheels locked, appropriate side rails in place.

## 2019-06-24 NOTE — ED ADULT NURSE NOTE - CHPI ED NUR SYMPTOMS NEG
no stiffness/no bruising/no deformity/no weakness/no fever/no back pain/no abrasion/no numbness/no tingling

## 2019-06-24 NOTE — ED PROVIDER NOTE - ATTENDING CONTRIBUTION TO CARE
44 yo with acute on chronic right knee pain. I personally saw the patient with the PA, and completed the key components of the history and physical exam. I then discussed the management plan with the PA.

## 2019-06-24 NOTE — ED PROVIDER NOTE - CLINICAL SUMMARY MEDICAL DECISION MAKING FREE TEXT BOX
Pt with knee pain for 2 years, no fracture seen on xray, pain over the patella tendon, most likely tendonitis, pt instructed on ibuprofen/ice/rest and given ortho follow up.

## 2019-06-24 NOTE — ED PROVIDER NOTE - PHYSICAL EXAMINATION
Right knee: no gross deformity of extremity, FROM active and passive, (-) crepitus with ROM, (-) effusion, (-) ballottement,  (-) tenderness patella, (-) tenderness proximal fibula, (-) tenderness femoral condyles, tenderness over the patella tendon Right knee: no gross deformity of extremity, FROM active and passive, (-) crepitus with ROM, (-) effusion, (-) tenderness patella, (-) tenderness proximal fibula, (-) tenderness femoral condyles, tenderness over the patella tendon

## 2019-06-24 NOTE — ED PROVIDER NOTE - OBJECTIVE STATEMENT
42yo female with no significant PMHx comes in due to right knee pain. Pt reports she has had knee pain for about 2 years now. Pt denies trauma to the knee, she has never seen a doctor for this. Pt reports the pain comes and goes and it has progressively gotten worse over the past 2 years. Pt has been ambulating with pain. Pt has occasionally taken Ibuprofen for the pain without relief. Pt denies swelling/warmth/redness to the knee. Pt denies f/c.

## 2019-10-14 ENCOUNTER — EMERGENCY (EMERGENCY)
Facility: HOSPITAL | Age: 44
LOS: 0 days | Discharge: HOME | End: 2019-10-14
Attending: EMERGENCY MEDICINE
Payer: MEDICAID

## 2019-10-14 VITALS — HEIGHT: 64 IN | WEIGHT: 149.91 LBS

## 2019-10-14 VITALS
OXYGEN SATURATION: 98 % | TEMPERATURE: 98 F | SYSTOLIC BLOOD PRESSURE: 113 MMHG | HEART RATE: 79 BPM | RESPIRATION RATE: 18 BRPM | DIASTOLIC BLOOD PRESSURE: 73 MMHG
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
## 2019-10-14 DIAGNOSIS — S63.502A UNSPECIFIED SPRAIN OF LEFT WRIST, INITIAL ENCOUNTER: ICD-10-CM

## 2019-10-14 DIAGNOSIS — M25.532 PAIN IN LEFT WRIST: ICD-10-CM
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
## 2019-10-14 PROCEDURE — 73110 X-RAY EXAM OF WRIST: CPT | Mod: 26,LT

## 2019-10-14 PROCEDURE — 99283 EMERGENCY DEPT VISIT LOW MDM: CPT | Mod: 25

## 2019-10-14 PROCEDURE — 73110 X-RAY EXAM OF WRIST: CPT | Mod: LT

## 2019-10-14 PROCEDURE — 99284 EMERGENCY DEPT VISIT MOD MDM: CPT

## 2019-10-14 RX ORDER — IBUPROFEN 200 MG
600 TABLET ORAL ONCE
Refills: 0 | Status: COMPLETED | OUTPATIENT
Start: 2019-10-14 | End: 2019-10-14

## 2019-10-14 RX ADMIN — Medication 600 MILLIGRAM(S): at 20:56

## 2019-10-14 NOTE — ED STATDOCS - OBJECTIVE STATEMENT
44 y/o f with no PMHx presenting to the ED c/o left wrist pain since waking up x4 days ago. Denies fever, chills, any other acute c/o. Pt plays the guitar. No medications taken for pain PTA. Pt is right handed.

## 2019-10-14 NOTE — ED ADULT NURSE NOTE - TEMPLATE
Orthopedic

## 2019-10-14 NOTE — ED ADULT NURSE REASSESSMENT NOTE - NS ED NURSE REASSESS COMMENT FT1
1930: Attempted to call patient into ED, no answer  1940: Attempted to call patient into ED, no answer, as per ED security, patient informed him she was going to cafeteria  1950: Attempted to call patient into ED, no answer  2000: Attempted to call patient into ED, no answer   2006: Patient arrived back into waiting room upset with triage RN about her wait time. Informed patient we have attempted to call patient multiple times with no answer, patient states "Well I wanted a sandwich" Informed patient she will be called in shortly.

## 2019-10-14 NOTE — ED ADULT NURSE NOTE - NSIMPLEMENTINTERV_GEN_ALL_ED
Implemented All Universal Safety Interventions:  [place] to call system. Call bell, personal items and telephone within reach. Instruct patient to call for assistance. Room bathroom lighting operational. Non-slip footwear when patient is off stretcher. Physically safe environment: no spills, clutter or unnecessary equipment. Stretcher in lowest position, wheels locked, appropriate side rails in place.

## 2019-10-14 NOTE — ED STATDOCS - PATIENT PORTAL LINK FT
You can access the FollowMyHealth Patient Portal offered by [facility] by registering at the following website: [email or web address]. By joining [employer] FollowMyHealth portal, you will also be able to view your health information using other applications (apps) compatible with our system.

## 2019-10-14 NOTE — ED STATDOCS - ATTENDING CONTRIBUTION TO CARE
Attending Contribution to Care: I, Amada Slade, performed the initial face to face bedside interview with this patient regarding history of present illness, review of symptoms and relevant past medical, social and family history.  I completed an independent physical examination.  I was the initial provider who evaluated this patient and the history, physical, and MDM reflect this intial assessment. I have signed out the follow up of any pending tests after the original (i.e. labs, radiological studies) to the ACP with instructions to review any with instructions to review any concerning findings to me prior to discharge.  I have communicated the patient’s plan of care and disposition with the ACP.

## 2019-10-14 NOTE — ED STATDOCS - PROGRESS NOTE DETAILS
44 y/o F presents with R wrist pain. Pt reports waking up with R wrist pain for the past 4 days. Denies any trauma, numbness or tingling, motor or sensory deficit or other complaints at this time. -Ben Forde PA-C Results reviewed and discussed with pt. Discussed importance of close FU with ortho. Pt asked to return to ED immediately for any new or concerning sx or worsening. Pt acknowledges and understands plan -Ben Forde PA-C

## 2019-10-14 NOTE — ED STATDOCS - CARE PROVIDER_API CALL
[place])  Orthopaedic Surgery; Surgery of the Hand  [address] [place] [address]  Phone: [phone]  Fax: [phone]  Follow Up Time: 4-6 Days

## 2019-10-18 NOTE — ED ADULT NURSE NOTE - CHIEF COMPLAINT
The patient is a 41y Female complaining of pain, flank.
Benefits, risks, and possible complications of procedure explained to patient/caregiver who verbalized understanding and gave written consent./telephone consent from son

## 2019-10-21 ENCOUNTER — EMERGENCY (EMERGENCY)
Facility: HOSPITAL | Age: 44
LOS: 1 days | Discharge: HOME | End: 2019-10-21
Attending: EMERGENCY MEDICINE
Payer: COMMERCIAL

## 2019-10-21 VITALS
TEMPERATURE: 98 F | HEIGHT: 63 IN | WEIGHT: 149.91 LBS | HEART RATE: 70 BPM | OXYGEN SATURATION: 97 % | RESPIRATION RATE: 20 BRPM

## 2019-10-21 PROCEDURE — 99282 EMERGENCY DEPT VISIT SF MDM: CPT

## 2019-10-21 NOTE — ED ADULT TRIAGE NOTE - CHIEF COMPLAINT QUOTE
I hurt my wrist, no obvious injury noted, pt is disheveled and unkept and intoxicated, denies any drug or alcohol use, unable to obtain BP pt unable to sit still

## 2019-10-22 NOTE — ED PROVIDER NOTE - PATIENT PORTAL LINK FT
You can access the FollowMyHealth Patient Portal offered by [facility] by registering at the following website: [email or web address]. By joining [employer]’s FollowMyHealth portal, you will also be able to view your health information using other applications (apps) compatible with our system.

## 2019-10-22 NOTE — ED PROVIDER NOTE - CLINICAL SUMMARY MEDICAL DECISION MAKING FREE TEXT BOX
Negative XR x1 weeks ago, no soft compartment, given Velcro wrist splint. Negative XR x1 weeks ago, no soft compartment, given Velcro wrist splint. ortho f/u.

## 2019-10-22 NOTE — ED PROVIDER NOTE - PHYSICAL EXAMINATION
Gen: No acute distress, non toxic  HEENT: Mucous membranes moist, pink conjunctivae, EOMI  CV: RRR, nl s1/s2.  Resp: CTAB, normal rate and effort  GI: Abdomen soft, NT, ND. No rebound, no guarding  : No CVAT  Neuro: A&O x 3, moving all 4 extremities  MSK: minimal tenderness proximal wrist dorsaly eventually, full range of motion, neurovascularly intact   Skin: No rashes. intact and perfused.

## 2019-10-22 NOTE — ED PROVIDER NOTE - NS ED ROS FT
Const: No fever/chills.   HEENT: No eye pain/changes in vision, No ear pain/sore throat/dysphagia  CV: No chest pain/palpitations.   Resp: No SOB/cough  GI: No abdominal pain, N/V/D, no black/bloody bm.   : No dysuria/frequency/discharge  Neuro: No headache. No Dizziness. No numbness/tingling/weakness.  Skin: No rashes or breaks in skin.   MSK: L wrist pain

## 2019-10-22 NOTE — ED PROVIDER NOTE - OBJECTIVE STATEMENT
42 y/o F with no significant PMHx presents to the ED c/o a constant L wrist pain onset x3 weeks ago. 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 Pt states that she went to her PMD and had a normal XR and was given a brace which relieves symptoms. However, pt lost her brace and states that symptoms came back. Pt is requesting a brace.   Denies f/c, n/v, cp, sob, palpitations, headache, dizziness, abdominal pain, back pain, neck pain.

## 2019-11-07 ENCOUNTER — EMERGENCY (EMERGENCY)
Facility: HOSPITAL | Age: 44
LOS: 1 days | Discharge: HOME | End: 2019-11-07
Attending: STUDENT IN AN ORGANIZED HEALTH CARE EDUCATION/TRAINING PROGRAM
Payer: COMMERCIAL

## 2019-11-07 VITALS
HEART RATE: 60 BPM | OXYGEN SATURATION: 100 % | SYSTOLIC BLOOD PRESSURE: 105 MMHG | WEIGHT: 145.06 LBS | DIASTOLIC BLOOD PRESSURE: 64 MMHG | TEMPERATURE: 98 F | RESPIRATION RATE: 16 BRPM | HEIGHT: 65 IN

## 2019-11-07 PROCEDURE — 99283 EMERGENCY DEPT VISIT LOW MDM: CPT

## 2019-11-07 NOTE — ED ADULT TRIAGE NOTE - CHIEF COMPLAINT QUOTE
Pt A&Ox4 states "a window closed on my head on both sides of head." Patient ambulated into ED, is having headache and pain with chills

## 2019-11-08 PROCEDURE — 99283 EMERGENCY DEPT VISIT LOW MDM: CPT

## 2019-11-08 PROCEDURE — 70150 X-RAY EXAM OF FACIAL BONES: CPT | Mod: 26

## 2019-11-08 PROCEDURE — 70150 X-RAY EXAM OF FACIAL BONES: CPT

## 2019-11-08 RX ORDER — ACETAMINOPHEN 500 MG
975 TABLET ORAL ONCE
Refills: 0 | Status: COMPLETED | OUTPATIENT
Start: 2019-11-08 | End: 2019-11-08

## 2019-11-08 RX ADMIN — Medication 975 MILLIGRAM(S): at 01:39

## 2019-11-08 NOTE — ED ADULT NURSE NOTE - NSFALLRSKASSESSDT_ED_ALL_ED
Outpatient Physical Therapy  DAILY TREATMENT     [facility] [address]  [address]  [place] [address]  Phone:  [phone]  Fax:  [phone]    Date: 08/09/2019    Patient: Mariela Huff  YOB: 1962  MRN: [id number]     Time Calculation  Start time: 0901  Stop time: 0935 Time Calculation (min): 34 minutes       Chief Complaint: Difficulty Walking    Visit #: 7    SUBJECTIVE:  Walking 10 min causes cramping to the point I can't walk (8/10). No difficulty or painful with stairs.  Barefoot walking is too painful on tile or hard surfaces.  Bicycle riding, pressing hard on pedal uphill causes cramping too.  States she can't hike anymore. Pt states if she walks slowly and focuses on exaggerating heel to toe, it feels a little better and she can walk a little farther.  But when she has to go quicker or is not paying attention to gait, it cramps up very quickly.    OBJECTIVE:        Therapeutic Exercises (CPT 84689):     1. Incline calf stretch, x30 sec    2. PF/DF standing on BOSU, x10 round surface, x10 flat surface    3. SLS, x30 sec B round surface, x30 sec B flat surface    4. Standing heel raises on Airex, x15    5. Seated BAPS, level2, R x10 CW/CCW    Therapeutic Treatments and Modalities:     1. Manual Therapy (CPT 30129), AP MTs 2-4, AP and PAs MTP joints 2-4, gentle distraction MTP joints 2-3, PROM flexion and extenison MTPs and PIPs, STM intrinsics and flexors    Time-based treatments/modalities:  Manual therapy minutes (CPT 85097): 15 minutes  Therapeutic exercise minutes (CPT 07617): 15 minutes     ASSESSMENT:   Response to treatment: Mild improvement in ankle ROM creating mild improvements in gait.      PLAN/RECOMMENDATIONS:   Plan for treatment: refer patient back to MD.  Continue HEP.  Planned interventions for next visit: STEFFI PT.        08-Nov-2019 00:11

## 2019-11-08 NOTE — ED PROVIDER NOTE - ENMT, MLM
Airway patent, Nasal mucosa clear. Mouth with normal mucosa. Throat has no vesicles, no oropharyngeal exudates and uvula is midline. NCAT, non tender, Airway patent, Nasal mucosa clear. Mouth with normal mucosa. Throat has no vesicles, no oropharyngeal exudates and uvula is midline.

## 2019-11-08 NOTE — ED ADULT NURSE NOTE - CHPI ED NUR SYMPTOMS NEG
no seizure/no blurred vision/no dizziness/no nausea/no syncope/no vomiting/no weakness/no change in level of consciousness/no confusion/no loss of consciousness

## 2019-11-08 NOTE — ED ADULT NURSE NOTE - SUICIDE SCREENING QUESTION 2
Erythema and TTP of abdomen, surrounding prior PEG site, started Cefazolin for cellulitis. CT shows no intraabdominal pathology.  F/u blood cx. No

## 2019-11-08 NOTE — ED ADULT NURSE NOTE - OBJECTIVE STATEMENT
Pt received AOx4 resting comfortably in stretcher, respirations even and unlabored, no apparent distress noted at this time. Pt states at approximately 2100 Pt received AOx4 resting comfortably in stretcher, respirations even and unlabored, no apparent distress noted at this time. Pt states at approximately 2100, a window that was being held up by a stick accidentally hit the Right side of her head after stick was knocked out of window. States c/o HA. Denies LOC, denies blurry vision, N/V, or blood thinner use. pt educated on plan of care, pt able to successfully teach back plan of care to RN, RN will continue to reeducate pt during hospital stay.

## 2019-11-08 NOTE — ED PROVIDER NOTE - PATIENT PORTAL LINK FT
You can access the FollowMyHealth Patient Portal offered by [facility] by registering at the following website: [email or web address]. By joining "[employer]’s FollowMyHealth portal, you will also be able to view your health information using other applications (apps) compatible with our system.

## 2019-11-08 NOTE — ED PROVIDER NOTE - CLINICAL SUMMARY MEDICAL DECISION MAKING FREE TEXT BOX
43 year old female with no reported Hx presenting to the ED c/o head pain that happened couple hours ago. 43 year old female with no reported Hx presenting to the ED c/o head pain that happened couple hours ago - low mechanism head trauma insisting on xray, which looks wnl, no acute distress, normal exam, follow up with pmd

## 2019-11-08 NOTE — ED PROVIDER NOTE - OBJECTIVE STATEMENT
43 year old female with no reported Hx presenting to the ED c/o head pain that happened couple hours ago. Pt states she was at a friend's house, looking outside the window, and the window hit her head. Denies LOC or any other trama. Denies taking blood thinners. Pt denies fevers/chills, ha, loc, focal neuro deficits, cp/sob/palp, cough, abd pain/n/v/d, urinary symptoms, recent travel and sick contacts.

## 2020-01-21 ENCOUNTER — EMERGENCY (EMERGENCY)
Facility: HOSPITAL | Age: 45
LOS: 1 days | End: 2020-01-21
Admitting: EMERGENCY MEDICINE
Payer: MEDICAID

## 2020-01-21 PROCEDURE — 99283 EMERGENCY DEPT VISIT LOW MDM: CPT

## 2020-02-10 ENCOUNTER — EMERGENCY (EMERGENCY)
Facility: HOSPITAL | Age: 45
LOS: 1 days | Discharge: HOME | End: 2020-02-10
Attending: EMERGENCY MEDICINE
Payer: MEDICAID

## 2020-02-10 VITALS
HEART RATE: 64 BPM | DIASTOLIC BLOOD PRESSURE: 45 MMHG | RESPIRATION RATE: 16 BRPM | SYSTOLIC BLOOD PRESSURE: 103 MMHG | TEMPERATURE: 98 F | OXYGEN SATURATION: 97 %

## 2020-02-10 VITALS — HEIGHT: 64 IN | WEIGHT: 160.06 LBS

## 2020-02-10 DIAGNOSIS — M25.561 PAIN IN RIGHT KNEE: ICD-10-CM

## 2020-02-10 DIAGNOSIS — M70.51 OTHER BURSITIS OF KNEE, RIGHT KNEE: ICD-10-CM

## 2020-02-10 PROCEDURE — 73562 X-RAY EXAM OF KNEE 3: CPT | Mod: RT

## 2020-02-10 PROCEDURE — 73590 X-RAY EXAM OF LOWER LEG: CPT | Mod: 26,RT

## 2020-02-10 PROCEDURE — 73562 X-RAY EXAM OF KNEE 3: CPT | Mod: 26,RT

## 2020-02-10 PROCEDURE — 73590 X-RAY EXAM OF LOWER LEG: CPT | Mod: RT

## 2020-02-10 PROCEDURE — 99284 EMERGENCY DEPT VISIT MOD MDM: CPT

## 2020-02-10 RX ORDER — IBUPROFEN 200 MG
600 TABLET ORAL ONCE
Refills: 0 | Status: COMPLETED | OUTPATIENT
Start: 2020-02-10 | End: 2020-02-10

## 2020-02-10 RX ADMIN — Medication 600 MILLIGRAM(S): at 02:29

## 2020-02-10 NOTE — ED PROVIDER NOTE - PATIENT PORTAL LINK FT
You can access the FollowMyHealth Patient Portal offered by [facility] by registering at the following website: [email or web address]. By joining [employer]’s FollowMyHealth portal, you will also be able to view your health information using other applications (apps) compatible with our system.

## 2020-02-10 NOTE — ED PROVIDER NOTE - OBJECTIVE STATEMENT
45 yo F hx of chronic right knee pain, presents with CC of right knee pain.  States has history of bursitis.  Does not know if she's injured knee recently.  States that she's been walking on it this past week without crutches or a knee brace, and c/o pain at this area and into right shin.  No meds taken at home.  No other concerns.

## 2020-02-10 NOTE — ED PROVIDER NOTE - CLINICAL SUMMARY MEDICAL DECISION MAKING FREE TEXT BOX
XR negative.  Exam largely unremarkable, only mild tenderness on exam, without any other findings, no concern for septic arthritis or other MSK emergency.  Pt requesting crutches, ACE.  Will give along with motrin.  D/c home.  F/u with PCP.

## 2020-02-10 NOTE — ED ADULT NURSE NOTE - MUSCULOSKELETAL WDL
Full range of motion of upper and lower extremities, no joint tenderness/swelling. What Type Of Note Output Would You Prefer (Optional)?: Bullet Format How Severe Is Your Skin Lesion?: mild Has Your Skin Lesion Been Treated?: not been treated Is This A New Presentation, Or A Follow-Up?: Skin Lesions

## 2020-03-21 ENCOUNTER — EMERGENCY (EMERGENCY)
Facility: HOSPITAL | Age: 45
LOS: 1 days | End: 2020-03-21
Admitting: EMERGENCY MEDICINE
Payer: MEDICAID

## 2020-03-21 PROCEDURE — 99283 EMERGENCY DEPT VISIT LOW MDM: CPT

## 2020-03-21 PROCEDURE — 73110 X-RAY EXAM OF WRIST: CPT | Mod: 26,LT

## 2020-05-01 ENCOUNTER — OUTPATIENT (OUTPATIENT)
Dept: OUTPATIENT SERVICES | Facility: HOSPITAL | Age: 45
LOS: 1 days | End: 2020-05-01
Payer: MEDICAID

## 2020-05-01 PROCEDURE — G9001: CPT

## 2020-05-24 ENCOUNTER — EMERGENCY (EMERGENCY)
Facility: HOSPITAL | Age: 45
LOS: 1 days | Discharge: OTHER INSTITUTION | End: 2020-05-24
Attending: EMERGENCY MEDICINE
Payer: COMMERCIAL

## 2020-05-24 ENCOUNTER — EMERGENCY (EMERGENCY)
Facility: HOSPITAL | Age: 45
LOS: 1 days | Discharge: HOME | End: 2020-05-24
Attending: EMERGENCY MEDICINE
Payer: COMMERCIAL

## 2020-05-24 VITALS
DIASTOLIC BLOOD PRESSURE: 82 MMHG | HEIGHT: 64 IN | SYSTOLIC BLOOD PRESSURE: 121 MMHG | RESPIRATION RATE: 16 BRPM | HEART RATE: 61 BPM | TEMPERATURE: 98 F | OXYGEN SATURATION: 100 %

## 2020-05-24 VITALS
SYSTOLIC BLOOD PRESSURE: 116 MMHG | DIASTOLIC BLOOD PRESSURE: 80 MMHG | HEIGHT: 64 IN | TEMPERATURE: 98 F | HEART RATE: 53 BPM | WEIGHT: 149.91 LBS | RESPIRATION RATE: 18 BRPM | OXYGEN SATURATION: 100 %

## 2020-05-24 DIAGNOSIS — F32.2 MAJOR DEPRESSIVE DISORDER, SINGLE EPISODE, SEVERE WITHOUT PSYCHOTIC FEATURES: ICD-10-CM

## 2020-05-24 LAB
AMPHET UR-MCNC: NEGATIVE — SIGNIFICANT CHANGE UP
ANION GAP SERPL CALC-SCNC: 13 MMOL/L — SIGNIFICANT CHANGE UP (ref 5–17)
APAP SERPL-MCNC: <7.5 UG/ML — LOW (ref 10–26)
APPEARANCE UR: CLEAR — SIGNIFICANT CHANGE UP
BACTERIA # UR AUTO: ABNORMAL
BARBITURATES UR SCN-MCNC: NEGATIVE — SIGNIFICANT CHANGE UP
BASOPHILS # BLD AUTO: 0.07 K/UL — SIGNIFICANT CHANGE UP (ref 0–0.2)
BASOPHILS NFR BLD AUTO: 0.9 % — SIGNIFICANT CHANGE UP (ref 0–2)
BENZODIAZ UR-MCNC: NEGATIVE — SIGNIFICANT CHANGE UP
BILIRUB UR-MCNC: NEGATIVE — SIGNIFICANT CHANGE UP
BUN SERPL-MCNC: 16 MG/DL — SIGNIFICANT CHANGE UP (ref 8–20)
BURR CELLS BLD QL SMEAR: PRESENT — SIGNIFICANT CHANGE UP
CALCIUM SERPL-MCNC: 9.5 MG/DL — SIGNIFICANT CHANGE UP (ref 8.6–10.2)
CHLORIDE SERPL-SCNC: 101 MMOL/L — SIGNIFICANT CHANGE UP (ref 98–107)
CO2 SERPL-SCNC: 24 MMOL/L — SIGNIFICANT CHANGE UP (ref 22–29)
COCAINE METAB.OTHER UR-MCNC: POSITIVE
COLOR SPEC: YELLOW — SIGNIFICANT CHANGE UP
CREAT SERPL-MCNC: 0.66 MG/DL — SIGNIFICANT CHANGE UP (ref 0.5–1.3)
DIFF PNL FLD: NEGATIVE — SIGNIFICANT CHANGE UP
ELLIPTOCYTES BLD QL SMEAR: SLIGHT — SIGNIFICANT CHANGE UP
EOSINOPHIL # BLD AUTO: 0.25 K/UL — SIGNIFICANT CHANGE UP (ref 0–0.5)
EOSINOPHIL NFR BLD AUTO: 3.5 % — SIGNIFICANT CHANGE UP (ref 0–6)
EPI CELLS # UR: SIGNIFICANT CHANGE UP
ETHANOL SERPL-MCNC: <10 MG/DL — SIGNIFICANT CHANGE UP
GIANT PLATELETS BLD QL SMEAR: PRESENT — SIGNIFICANT CHANGE UP
GLUCOSE SERPL-MCNC: 94 MG/DL — SIGNIFICANT CHANGE UP (ref 70–99)
GLUCOSE UR QL: NEGATIVE MG/DL — SIGNIFICANT CHANGE UP
HCG SERPL-ACNC: <4 MIU/ML — SIGNIFICANT CHANGE UP
HCT VFR BLD CALC: 37.5 % — SIGNIFICANT CHANGE UP (ref 34.5–45)
HGB BLD-MCNC: 11.4 G/DL — LOW (ref 11.5–15.5)
KETONES UR-MCNC: ABNORMAL
LEUKOCYTE ESTERASE UR-ACNC: NEGATIVE — SIGNIFICANT CHANGE UP
LYMPHOCYTES # BLD AUTO: 1.86 K/UL — SIGNIFICANT CHANGE UP (ref 1–3.3)
LYMPHOCYTES # BLD AUTO: 25.7 % — SIGNIFICANT CHANGE UP (ref 13–44)
MANUAL SMEAR VERIFICATION: SIGNIFICANT CHANGE UP
MCHC RBC-ENTMCNC: 22.6 PG — LOW (ref 27–34)
MCHC RBC-ENTMCNC: 30.4 GM/DL — LOW (ref 32–36)
MCV RBC AUTO: 74.4 FL — LOW (ref 80–100)
METHADONE UR-MCNC: NEGATIVE — SIGNIFICANT CHANGE UP
MONOCYTES # BLD AUTO: 0.13 K/UL — SIGNIFICANT CHANGE UP (ref 0–0.9)
MONOCYTES NFR BLD AUTO: 1.8 % — LOW (ref 2–14)
NEUTROPHILS # BLD AUTO: 4.87 K/UL — SIGNIFICANT CHANGE UP (ref 1.8–7.4)
NEUTROPHILS NFR BLD AUTO: 67.2 % — SIGNIFICANT CHANGE UP (ref 43–77)
NITRITE UR-MCNC: NEGATIVE — SIGNIFICANT CHANGE UP
OPIATES UR-MCNC: NEGATIVE — SIGNIFICANT CHANGE UP
PCP SPEC-MCNC: SIGNIFICANT CHANGE UP
PCP UR-MCNC: NEGATIVE — SIGNIFICANT CHANGE UP
PH UR: 6 — SIGNIFICANT CHANGE UP (ref 5–8)
PLAT MORPH BLD: NORMAL — SIGNIFICANT CHANGE UP
PLATELET # BLD AUTO: 266 K/UL — SIGNIFICANT CHANGE UP (ref 150–400)
POIKILOCYTOSIS BLD QL AUTO: SLIGHT — SIGNIFICANT CHANGE UP
POLYCHROMASIA BLD QL SMEAR: SLIGHT — SIGNIFICANT CHANGE UP
POTASSIUM SERPL-MCNC: 4.2 MMOL/L — SIGNIFICANT CHANGE UP (ref 3.5–5.3)
POTASSIUM SERPL-SCNC: 4.2 MMOL/L — SIGNIFICANT CHANGE UP (ref 3.5–5.3)
PROT UR-MCNC: 15 MG/DL
RBC # BLD: 5.04 M/UL — SIGNIFICANT CHANGE UP (ref 3.8–5.2)
RBC # FLD: 15.7 % — HIGH (ref 10.3–14.5)
RBC BLD AUTO: ABNORMAL
RBC CASTS # UR COMP ASSIST: SIGNIFICANT CHANGE UP /HPF (ref 0–4)
SALICYLATES SERPL-MCNC: <0.6 MG/DL — LOW (ref 10–20)
SODIUM SERPL-SCNC: 138 MMOL/L — SIGNIFICANT CHANGE UP (ref 135–145)
SP GR SPEC: 1.02 — SIGNIFICANT CHANGE UP (ref 1.01–1.02)
THC UR QL: POSITIVE
UROBILINOGEN FLD QL: 1 MG/DL
VARIANT LYMPHS # BLD: 0.9 % — SIGNIFICANT CHANGE UP (ref 0–6)
WBC # BLD: 7.24 K/UL — SIGNIFICANT CHANGE UP (ref 3.8–10.5)
WBC # FLD AUTO: 7.24 K/UL — SIGNIFICANT CHANGE UP (ref 3.8–10.5)
WBC UR QL: SIGNIFICANT CHANGE UP

## 2020-05-24 PROCEDURE — 85027 COMPLETE CBC AUTOMATED: CPT

## 2020-05-24 PROCEDURE — 93010 ELECTROCARDIOGRAM REPORT: CPT

## 2020-05-24 PROCEDURE — 84702 CHORIONIC GONADOTROPIN TEST: CPT

## 2020-05-24 PROCEDURE — 99285 EMERGENCY DEPT VISIT HI MDM: CPT

## 2020-05-24 PROCEDURE — 99283 EMERGENCY DEPT VISIT LOW MDM: CPT

## 2020-05-24 PROCEDURE — 36415 COLL VENOUS BLD VENIPUNCTURE: CPT

## 2020-05-24 PROCEDURE — 81001 URINALYSIS AUTO W/SCOPE: CPT

## 2020-05-24 PROCEDURE — 80307 DRUG TEST PRSMV CHEM ANLYZR: CPT

## 2020-05-24 PROCEDURE — U0003: CPT

## 2020-05-24 PROCEDURE — 90792 PSYCH DIAG EVAL W/MED SRVCS: CPT

## 2020-05-24 PROCEDURE — 80048 BASIC METABOLIC PNL TOTAL CA: CPT

## 2020-05-24 PROCEDURE — 93005 ELECTROCARDIOGRAM TRACING: CPT

## 2020-05-24 RX ORDER — PERMETHRIN CREAM 5% W/W 50 MG/G
1 CREAM TOPICAL
Qty: 1 | Refills: 0
Start: 2020-05-24

## 2020-05-24 RX ORDER — CHLORHEXIDINE GLUCONATE 213 G/1000ML
1 SOLUTION TOPICAL
Qty: 14 | Refills: 0
Start: 2020-05-24 | End: 2020-06-06

## 2020-05-24 NOTE — ED STATDOCS - CLINICAL SUMMARY MEDICAL DECISION MAKING FREE TEXT BOX
Patient presents c/o depression, will consult . Patient also presents with facial rash; seen today at [facility] for same and discharged home with meds; no signs of infection on exam.

## 2020-05-24 NOTE — ED BEHAVIORAL HEALTH ASSESSMENT NOTE - HPI (INCLUDE ILLNESS QUALITY, SEVERITY, DURATION, TIMING, CONTEXT, MODIFYING FACTORS, ASSOCIATED SIGNS AND SYMPTOMS)
This is a 44-year-old, single, white female; with no prior psychiatric history; no prior substance use history; no current medical concerns except for bed bug bites who presented to the ED c/o depressive symptoms.    On assessment patient is notably anhedonic;, appearing guarded, with poor eye contact, reporting current depressed mood and low energy. Does not feel like going on any longer, noting "I'm trying not to think bout suicide". She reports current global insomnia, poor appetite and current hopelessness, and would say yes or no when she was asked about current plan she may have. She denies current psychotic symptoms, constantine and noted to  exhibit some anxiety. She denies any history of childhood trauma and denies history of alcohol or drug addiction. However, she states she is unemployed and lives in a shelter. She has no support system and has no outpatient psychiatric services in place. She is requesting for inpatient psychiatric admission. This is a 44-year-old, single, white female; with no prior psychiatric history; no prior substance use history; no current medical concerns except for bed bug bites who presented to the ED c/o depressive symptoms.    On assessment patient is notably anhedonic;, appearing guarded, with poor eye contact, reporting current depressed mood and low energy. Does not feel like going on any longer, noting "I'm trying not to think bout suicide". She reports current global insomnia, poor appetite and current hopelessness, and would not say yes or no when she was asked about current plans she may have. She denies current psychotic symptoms, constantine and noted to exhibit some anxiety. She denies any history of childhood trauma and denies history of alcohol or drug addiction. However, she states she is unemployed and lives in a shelter. She has no support system and has no outpatient psychiatric services in place. She is requesting for inpatient psychiatric admission "so I can clear my mind" as reported by patient. This is a 44-year-old, single, white female; with no prior psychiatric history; no prior substance use history; no current medical concerns except for bed bug bites who presented to the ED c/o depressive symptoms.    On assessment patient is notably anhedonic;, appearing guarded, with poor eye contact, reporting current depressed mood and low energy. Does not feel like going on any longer, noting "I'm trying not to think bout suicide". She reports current global insomnia, poor appetite and current hopelessness, and would not say yes or no when she was asked about current plans she may have. She denies current psychotic symptoms, constantine and noted to exhibit some anxiety. She denies any history of childhood trauma and denies history of alcohol or drug addiction. However, she states she is unemployed and lives in a shelter. She has no support system and has no outpatient psychiatric services in place. She is requesting for inpatient psychiatric admission "so I can clear my mind" as reported by patient.    Of note, although patient denied any history of illicit drug use, her lab results are positive for cocaine and THC.

## 2020-05-24 NOTE — ED PROVIDER NOTE - CLINICAL SUMMARY MEDICAL DECISION MAKING FREE TEXT BOX
PT with stable VS, no acute distress, non toxic appearing, tolerating PO in the ED, PT with no s/s of localized or systemic infection, with multiple areas of excoriation that are either bites vs colonization of MRSA will tx for both dc home with follow upto [facility], educated about when to return to the ED if needed. PT verbalizes that he understands all instructions and results.

## 2020-05-24 NOTE — ED STATDOCS - OBJECTIVE STATEMENT
45 y/o F pt presents c/o depression. Patient discharged from [facility] 1 hour ago with s/p c/o facial rash. Patient is homeless and lives in shelter. States her shelter recently had infestation of bed bugs. States rash is not itchy. Was discharged home diagnosed with cellulitis and permethrin. Returns c/o depression for past few weeks. Denies HI/SI. Denies auditory or visual hallucinations. Denies illict drug use or ETOH. Denies fever, chills. No further complaints at this time.

## 2020-05-24 NOTE — ED BEHAVIORAL HEALTH ASSESSMENT NOTE - RISK ASSESSMENT
HIGH risk for suicide.  RISK FACTORS: Current suicidal ideations and current hopelessness; endorses severe sleep disturbances and poor appetite. She lacks support network.  Protective factors: She is willing to get help for her mental health. High Acute Suicide Risk HIGH risk for suicide.  RISK FACTORS: Current suicidal ideations and current hopelessness; endorses severe sleep disturbances and poor appetite. She lacks support network. Patient is positive for THC despite denying drug use.  Protective factors: She is willing to get help for her mental health.

## 2020-05-24 NOTE — ED STATDOCS - NS ED ROS FT
Review of Systems  •	CONSTITUTIONAL - no  fever, no diaphoresis, no weight change  •	SKIN -  + facial rash  •	HEMATOLOGIC - no bleeding, no bruising  •	EYES - no eye pain, no blurred vision  •	ENT - no change in hearing, no pain  •	RESPIRATORY - no shortness of breath, no cough  •	CARDIAC - no chest pain, no palpitations  •	GI - no abd pain, no nausea, no vomiting, no diarrhea, no constipation, no bleeding  •	GENITO-URINARY - no discharge, no dysuria; no hematuria,   •	ENDO - no polydipsia, no polyuria, no heat/no cold intolerance  •	MUSCULOSKELETAL - no joint pain, no swelling, no redness  •	NEUROLOGIC - no weakness, no headache, no anesthesia, no paresthesias  +	PSYCH -  +anxiety, non suicidal, non homicidal, no hallucination, +depression

## 2020-05-24 NOTE — ED ADULT NURSE NOTE - HPI (INCLUDE ILLNESS QUALITY, SEVERITY, DURATION, TIMING, CONTEXT, MODIFYING FACTORS, ASSOCIATED SIGNS AND SYMPTOMS)
received pt in street clothing affect is flat. pt is slow to answer.  pt c/o depression feeling hopeless. stating" I feel like my life is coming to an end." I am alone. I got a divorce and went into a shelter.  before covid I got a job now I don't have it.  the job closed d/t covid.  pt denies pph denies pmd.  denies avh denies s/h/i. denies rug and etoh use.  pt admits to just feeling depressed and needed to "get away for a few weeks to try to figure things out".   pt ias calm slow to answer.  safewty maITAINED OPRIENTED TO UNIT SURROUNDS MADE COMFORTABLE.  EXPLAINED PLAN OF CARE.

## 2020-05-24 NOTE — ED ADULT TRIAGE NOTE - CHIEF COMPLAINT QUOTE
"I think I need to get away for a couple of days and I feel like ppl are against me and I feel lonely and depressed," Was just discharged.

## 2020-05-24 NOTE — ED PROVIDER NOTE - PATIENT PORTAL LINK FT
You can access the FollowMyHealth Patient Portal offered by [facility] by registering at the following website: [email or web address]. By joining [employer]’s FollowMyHealth portal, you will also be able to view your health information using other applications (apps) compatible with our system.

## 2020-05-24 NOTE — ED PROVIDER NOTE - OBJECTIVE STATEMENT
PT with no SPMHx presents to the ED with complaint of rash x5 days. Pt states that she noticed has been having a rash that she is unsure of were it states but is now spread around her body. Pt states that she has been having itched mild painful non radiating pain that feels like soreness that is not improved or made worse by anything. Pt states that she has not tx at home with anything and that it has gotten worse on its own. PT dines fever, chills, SOB, diff breathing, HA, dizziness, cough.

## 2020-05-24 NOTE — ED BEHAVIORAL HEALTH ASSESSMENT NOTE - SUMMARY
This is a 44 year old, single, white female; with no prior  psychiatric history; no prior illicit substance use history or drug addiction;; no current medical concerns except for bed bug bites who presented to the ED c/o depressive symptoms.    Patient denies current constantine or psychosis; however, she reports current depressive symptoms including current hopelessness and helplessness. She endorses severe sleep disturbances and poor appetite and is noted to be withdrawn and appears older than her stated age. She is guarded and unable to reliably contract for safety. Patient will benefit from inpatient psychiatric admission  for safety.

## 2020-05-24 NOTE — ED BEHAVIORAL HEALTH ASSESSMENT NOTE - ACCOMPANIED BY
[facility]    If you have any questions regarding to your visit please contact your care team:       Team Purple:   Clinic Hours Telephone Number   Dr. Kat lEliott   7am-7pm  Monday - Thursday   7am-5pm  Fridays  [phone]  (Appointment scheduling available 24/7)    Questions about your Visit?   Team Line:  [phone]   Urgent Care - [place] and [facility] - 11am-9pm Monday-Friday Saturday-Sunday- 9am-5pm   [place] - 5pm-9pm Monday-Friday Saturday-Sunday- 9am-5pm  [phone] - [facility]  [phone] - [place]       What options do I have for visits at the clinic other than the traditional office visit?  To expand how we care for you, many of our providers are utilizing electronic visits (e-visits) and telephone visits, when medically appropriate, for interactions with their patients rather than a visit in the clinic.   We also offer nurse visits for many medical concerns. Just like any other service, we will bill your insurance company for this type of visit based on time spent on the phone with your provider. Not all insurance companies cover these visits. Please check with your medical insurance if this type of visit is covered. You will be responsible for any charges that are not paid by your insurance.      E-visits via [employer]:  generally incur a $35.00 fee.  Telephone visits:  Time spent on the phone: *charged based on time that is spent on the phone in increments of 10 minutes. Estimated cost:   5-10 mins $30.00   11-20 mins. $59.00   21-30 mins. $85.00     Use [employer] (secure email communication and access to your chart) to send your primary care provider a message or make an appointment. Ask someone on your Team how to sign up for [employer].  For a Price Quote for your services, please call our Consumer Price Line at [phone].  As always, Thank you for trusting us with your health care  needs!    Miriam Kincaid MA     Self

## 2020-05-24 NOTE — ED PROVIDER NOTE - ADDITIONAL NOTES AND INSTRUCTIONS:
PT was evaluated At [facility] ED and was found to have a condition that warranted time of to rest and heal from WORK/SCHOOL.   Ollie Odonnell PA-C

## 2020-05-24 NOTE — ED BEHAVIORAL HEALTH ASSESSMENT NOTE - DESCRIPTION (FIRST USE, LAST USE, QUANTITY, FREQUENCY, DURATION)
Patient is positive for THC despite denying drug use. Patient is positive for cocaine despite denying drug use.

## 2020-05-24 NOTE — ED STATDOCS - PHYSICAL EXAMINATION
VITAL SIGNS: I have reviewed nursing notes and confirm.  CONSTITUTIONAL: Well-developed; well-nourished; in no acute distress.  SKIN: Skin exam is warm and dry, +facial rash with excoriations, no surrounding erythema  HEAD: Normocephalic; atraumatic.  EYES: PERRL, EOM intact; conjunctiva and sclera clear.  ENT: No nasal discharge; airway clear. Throat clear.  NECK: Supple; non tender.    CARD: S1, S2 normal; Regular rate and rhythm.  RESP: No wheezes,  no rales or rhonchi.   ABD:  soft; non-distended; non-tender;   EXT: Normal ROM. No clubbing, cyanosis or edema.  NEURO: Alert, oriented. Grossly unremarkable. No focal deficits. no facial droop, moves all extremities,  normal gait + depression  PSYCH: Cooperative, appropriate.

## 2020-05-24 NOTE — ED BEHAVIORAL HEALTH ASSESSMENT NOTE - DESCRIPTION
Calm and cooperative with depressed mood.     Vital Signs Last 24 Hrs  T(C): 36.6 (24 May 2020 22:23), Max: 36.9 (24 May 2020 19:14)  T(F): 97.9 (24 May 2020 22:23), Max: 98.5 (24 May 2020 19:14)  HR: 56 (24 May 2020 22:23) (53 - 61)  BP: 114/76 (24 May 2020 22:23) (114/76 - 121/82)  BP(mean): --  RR: 18 (24 May 2020 22:23) (16 - 18)  SpO2: 100% (24 May 2020 22:23) (100% - 100%) Denies See HPI

## 2020-05-24 NOTE — ED ADULT NURSE NOTE - OBJECTIVE STATEMENT
I am feeling like every day is worse and worse. I am getting depressed. my life is going in the wrong direction

## 2020-05-24 NOTE — ED ADULT TRIAGE NOTE - CHIEF COMPLAINT QUOTE
Pt states "I was staying in a shelter and I woke up today with bites all over that are worse since three days ago on my face, my legs, my arms, my back"

## 2020-05-24 NOTE — ED PROVIDER NOTE - ATTENDING CONTRIBUTION TO CARE
HPI: 43yo female no PMH presenting with scabs over arms, legs, face, and back. Have been going on "for a while." States that they are not pruritic but burn.     PE:  Gen: NAD  Head: NCAT  CV: normal perfusion  Resp: no respiratory distress  GI: Abd Soft NTND  Neuro: No focal neuro deficits  MSK: FROM all 4 extremities, no deformity  Skin: diffuse nonblanching scabs on arms, legs, face, no burrowing, no surrounding erythema/warmth  Psych: Normal affect    MDM: 43yo female no PMH presenting with scabs all over body- unclear if self-inflicted vs bed bugs vs possible scabies, give permethrin, tx for secondary cellulitis and reassess. Leigha Best DO         I performed a history and physical exam of the patient and discussed their management with the resident. I reviewed the resident's note and agree with the documented findings and plan of care. My medical decision making and observations are found above. HPI: 43yo female no PMH presenting with scabs over arms, legs, face, and back. Have been going on "for a while." States that they are not pruritic but burn.     PE:  Gen: NAD  Head: NCAT  CV: normal perfusion  Resp: no respiratory distress  GI: Abd Soft NTND  Neuro: No focal neuro deficits  MSK: FROM all 4 extremities, no deformity  Skin: diffuse nonblanching scabs on arms, legs, face, no burrowing, no surrounding erythema/warmth  Psych: Normal affect    MDM: 43yo female no PMH presenting with scabs all over body- unclear if self-inflicted vs bed bugs vs possible scabies, give permethrin, tx for secondary cellulitis and reassess. Leigha Best DO         I performed a history and physical exam of the patient and discussed their management with the acp. I reviewed the resident's note and agree with the documented findings and plan of care. My medical decision making and observations are found above.

## 2020-05-25 VITALS
TEMPERATURE: 99 F | OXYGEN SATURATION: 100 % | SYSTOLIC BLOOD PRESSURE: 105 MMHG | DIASTOLIC BLOOD PRESSURE: 66 MMHG | HEART RATE: 57 BPM | RESPIRATION RATE: 18 BRPM

## 2020-05-25 LAB — SARS-COV-2 RNA SPEC QL NAA+PROBE: SIGNIFICANT CHANGE UP

## 2020-05-25 NOTE — ED ADULT NURSE REASSESSMENT NOTE - NS ED NURSE REASSESS COMMENT FT1
Assumed care of pt. pt presented lunch in her room and consumed 100%. pt educated in plan of care to transfer to [facility]. pt verbalized understanding. pt offers no complaints at this time and is in NAD.
Assumed care of patient at 0720.  Patient resting in bed, asleep with no distress noted.  Safety of patient maintained.
Patient educated about plan of care for pending transfer when bed is available.  Patient pleasant and calm and verbalized understanding of plan of care.  After eating 75% of her breakfast patient returned to bed.  Safety of patient maintained.
Patient provided lunch and is eating at bedside.  Patient educated about pending transfer to [facility] and agrees with plan.  No attempts to harm self or others and safety maintained.

## 2020-05-25 NOTE — ED BEHAVIORAL HEALTH NOTE - BEHAVIORAL HEALTH NOTE
Telepsychiatry Reassessment Note:    Telepsychiatry received handoff on patient from [facility] psych team.     MD spoke with RN to obtain updated ED course information, she reports patient has been sleeping for duration of current shift.     Patient seen via telepsychiatry, observed to be sleeping in stretcher.     COVID result pending.     A/P:    Patient is a 43 y/o F, without known psychiatric history, who presents to ED with complaint of depression. She is being held for IPP as per [facility] psych team, dispo is pending COVID result.     - covid pending  - hold for bed, disposition cannot be made prior to COVID result

## 2020-05-25 NOTE — ED ADULT NURSE REASSESSMENT NOTE - GENERAL PATIENT STATE
resting/sleeping
resting/sleeping
comfortable appearance/cooperative
comfortable appearance/cooperative
comfortable appearance/resting/sleeping

## 2020-05-25 NOTE — ED BEHAVIORAL HEALTH NOTE - BEHAVIORAL HEALTH NOTE
Social work note: Pt accepted to [facility] under the care of Dr. Goldman.  Worker attempted to call [facility] to obtain authorization, office currently closed due to holiday.  Transportation arranged and pt completed 9.13 legals. Complete package arranged. SW to follow for authorization.

## 2020-05-25 NOTE — ED BEHAVIORAL HEALTH NOTE - BEHAVIORAL HEALTH NOTE
Social work note:  Pt evaluated by psychiatrist, per evaluation pt requires inpt treatment. Worker placed call to [facility] for review. Keira stated case will be reviewed and will  back. SW to follow.

## 2020-05-26 NOTE — CHART NOTE - NSCHARTNOTEFT_GEN_A_CORE
SW Note: pt was transferred to [facility] on 5/25 for inpt psychiatric care. Called ins listed on face sheet, [employer] medicaid [phone]. Spoke with Leyda Johnson approved for 2 days 5/25 & 5/26. Auth# [id number]. Info forwarded to [facility] UR dept.

## 2020-05-28 DIAGNOSIS — Z71.89 OTHER SPECIFIED COUNSELING: ICD-10-CM

## 2020-06-09 ENCOUNTER — EMERGENCY (EMERGENCY)
Facility: HOSPITAL | Age: 45
LOS: 1 days | Discharge: HOME | End: 2020-06-09
Attending: EMERGENCY MEDICINE
Payer: COMMERCIAL

## 2020-06-09 VITALS
TEMPERATURE: 98 F | WEIGHT: 160.06 LBS | SYSTOLIC BLOOD PRESSURE: 147 MMHG | OXYGEN SATURATION: 98 % | RESPIRATION RATE: 18 BRPM | HEART RATE: 88 BPM | DIASTOLIC BLOOD PRESSURE: 86 MMHG

## 2020-06-09 PROCEDURE — 99283 EMERGENCY DEPT VISIT LOW MDM: CPT

## 2020-06-09 RX ORDER — CEPHALEXIN 500 MG
1 CAPSULE ORAL
Qty: 28 | Refills: 0
Start: 2020-06-09 | End: 2022-07-28

## 2020-06-09 RX ORDER — CEPHALEXIN 500 MG
1 CAPSULE ORAL
Qty: 28 | Refills: 0
Start: 2020-06-09 | End: 2020-06-15

## 2020-06-09 RX ORDER — CEPHALEXIN 500 MG
500 CAPSULE ORAL ONCE
Refills: 0 | Status: COMPLETED | OUTPATIENT
Start: 2020-06-09 | End: 2020-06-09

## 2020-06-09 RX ADMIN — Medication 500 MILLIGRAM(S): at 02:56

## 2020-06-09 NOTE — ED PROVIDER NOTE - OBJECTIVE STATEMENT
Limited history. Patient uncooperative. 45 yo female presents to ED c/o rash. Patient has never follow up with dermatologist.   Denies fever, pruritus. Limited history. Patient uncooperative. 43 yo female presents to ED c/o diffuse body rash. Patient has never follow up with dermatologist.   Denies fever, pruritus.

## 2020-06-09 NOTE — ED PROVIDER NOTE - PHYSICAL EXAMINATION
Skin: Diffuse circular scabbing of varying sizes with surrounding erythema to b/l upper and lower extremities. No drainage.

## 2020-06-09 NOTE — ED ADULT TRIAGE NOTE - CHIEF COMPLAINT QUOTE
Patient is alert and oriented x3 c/o generalized bites and marks all over her body, states there itchy. rude and argumentative in triage. did not want to give name in triage. denies drugs or etoh.

## 2020-06-09 NOTE — ED PROVIDER NOTE - CLINICAL SUMMARY MEDICAL DECISION MAKING FREE TEXT BOX
45 yo female presents to ED c/o rash. 43 yo female PMHx depression, homelessness presents to ED c/o diffuse body rash. Patient rude, uncooperative during encounter. Patient recently evaluated by [facility] in May for similar, at this time then admitted to inpatient psychiatry. 43 yo female PMHx depression, homelessness presents to ED c/o diffuse body rash. Patient rude, uncooperative during encounter. Patient recently evaluated by [facility] in May for similar, at this time then admitted to inpatient psychiatry.  Plan:  - Meds

## 2020-06-09 NOTE — ED ADULT NURSE NOTE - OBJECTIVE STATEMENT
Patient is alert and oriented x3 c/o generalized bites and marks all over her body, states there itchy.  denies drugs or etoh.

## 2020-06-09 NOTE — ED PROVIDER NOTE - NSFOLLOWUPCLINICS_GEN_ALL_ED_FT
[id number] [place] [address]  Phone: [phone]  Fax:     [facility] [place]  Dermatology  [address] [place] [address]  Phone: [phone]  Fax: [phone]  Follow Up Time:

## 2020-06-09 NOTE — ED PROVIDER NOTE - PATIENT PORTAL LINK FT
You can access the FollowMyHealth Patient Portal offered by [facility] by registering at the following website: [email or web address]. By joining [employer]’s FollowMyHealth portal, you will also be able to view your health information using other applications (apps) compatible with our system.

## 2020-06-09 NOTE — ED PROVIDER NOTE - NSFOLLOWUPINSTRUCTIONS_ED_ALL_ED_FT
- Prescription sent to pharmacy.  - Please call tomorrow to schedule follow up appointment with dermatologist.   - Please call to schedule follow up appointment with your primary care physician within 24-48 hours.  - Please seek immediate medical attention for any new/worsening, signs/symptoms, or concerns.    Feel better!

## 2020-06-09 NOTE — ED PROVIDER NOTE - ATTENDING CONTRIBUTION TO CARE
I personally saw the patient with the PA, and completed the key components of the history and physical exam. I then discussed the management plan with the PA.   gen in nad resp clear cardiac no murmur abd soft skin mulitple x 4 exrtremeites punctate lesions no sings nec fasc no mucosal involvement covering secondary infection and advised derm f.u without fail pt non toxic, highly verbally aggressive with staff , charge rn made aware

## 2020-07-11 ENCOUNTER — EMERGENCY (EMERGENCY)
Facility: HOSPITAL | Age: 45
LOS: 1 days | Discharge: HOME | End: 2020-07-11
Attending: EMERGENCY MEDICINE
Payer: COMMERCIAL

## 2020-07-11 VITALS
TEMPERATURE: 98 F | RESPIRATION RATE: 17 BRPM | OXYGEN SATURATION: 99 % | HEART RATE: 50 BPM | SYSTOLIC BLOOD PRESSURE: 112 MMHG | DIASTOLIC BLOOD PRESSURE: 73 MMHG

## 2020-07-11 VITALS
OXYGEN SATURATION: 98 % | HEIGHT: 63 IN | DIASTOLIC BLOOD PRESSURE: 57 MMHG | RESPIRATION RATE: 18 BRPM | TEMPERATURE: 98 F | SYSTOLIC BLOOD PRESSURE: 114 MMHG | WEIGHT: 149.91 LBS | HEART RATE: 58 BPM

## 2020-07-11 LAB
MRSA PCR RESULT.: SIGNIFICANT CHANGE UP
S AUREUS DNA NOSE QL NAA+PROBE: SIGNIFICANT CHANGE UP

## 2020-07-11 PROCEDURE — 87640 STAPH A DNA AMP PROBE: CPT

## 2020-07-11 PROCEDURE — 99283 EMERGENCY DEPT VISIT LOW MDM: CPT

## 2020-07-11 PROCEDURE — 99284 EMERGENCY DEPT VISIT MOD MDM: CPT

## 2020-07-11 PROCEDURE — 87641 MR-STAPH DNA AMP PROBE: CPT

## 2020-07-11 NOTE — ED ADULT TRIAGE NOTE - CHIEF COMPLAINT QUOTE
patient with HX of MRSA states that she needs MRSA testing to clear her for Emergency Housing, has multiple bug bites to bilateral arms and legs with draining "sometimes" also requesting ABX cream that wound help in healing process

## 2020-07-11 NOTE — CHART NOTE - NSCHARTNOTEFT_GEN_A_CORE
SW Note: SW received handoff from day SW that pt was not allowed to return to [facility] emergency housing placement until 3 consecutive MRSA tests were completed. 3 neg. MRSA tests done. This writer followed up with [facility] emergency services, spoke to Althea who reports pt will be housed tonight at [facility] in [place]. Althea reports she will send [employer] taxi to pick pt up in ED Lobby. Pt made aware of above, RN and MD aware, no further  services identified

## 2020-07-11 NOTE — CHART NOTE - NSCHARTNOTEFT_GEN_A_CORE
SW met with pt for consult for placement. SW contacted [facility] emergency housing line, as pt is not admitted. Spoke with CW Dagoberto [phone], who reported pt was in [facility] yesterday 7/10/20, [facility] believed she was MRSA + and unable to return to the shelter until there was paperwork from the hospital stating she was MRSA -. Dagoberto reported that pt needs 3 negative MRSA swabs to return to her shelter. MARCOS looked into chart and saw that MRSA swab was cancelled then contacted MD to make him aware of DSS requests for housing. MD sending 3 swab to lab, if negative pt will return to [facility] housing. Pt aware and agreeable to plan. SW following.

## 2020-07-11 NOTE — ED PROVIDER NOTE - PROGRESS NOTE DETAILS
to discuss with social work in the morning As per sign-out from Dr. Aranda patient pending Social work to asssist with placement by DSS.

## 2020-07-11 NOTE — ED ADULT NURSE REASSESSMENT NOTE - NS ED NURSE REASSESS COMMENT FT1
Pt is resting in bed comfortably at this time, no apparent distress noted at this time. pt safety maintained. Pt denies any complaints at this time. pt educated on plan of care, plan of care taught back to RN. plan of care education deemed proficient through successful teach back. will continue to reeducate pt regarding plan of care. Pt awaiting SW consult.

## 2020-07-11 NOTE — ED PROVIDER NOTE - CLINICAL SUMMARY MEDICAL DECISION MAKING FREE TEXT BOX
female presenting for clearance for [facility] housing due to bites to the skin  will check with sw in am in necessary to be tested

## 2020-07-11 NOTE — ED PROVIDER NOTE - ATTENDING CONTRIBUTION TO CARE
Pt homeless, trying to arrange housing with DSS but was told she needs 3 negative MRSA tests. Pt has what appear to be healing bedbug bites but has no signs of any acute infection. Will await social work in the morning to clarify the work up, if any, necessary to arrange placement.

## 2020-07-11 NOTE — ED PROVIDER NOTE - PATIENT PORTAL LINK FT
You can access the FollowMyHealth Patient Portal offered by [facility] by registering at the following website: [email or web address]. By joining "[employer]’s FollowMyHealth portal, you will also be able to view your health information using other applications (apps) compatible with our system.

## 2020-07-11 NOTE — ED PROVIDER NOTE - NSFOLLOWUPINSTRUCTIONS_ED_ALL_ED_FT
1) Follow up with your doctor as needed for re-evaluation  2) Return to the ER for worsening or concerning symptoms    Insect Bite, Adult  An insect bite can make your skin red, itchy, and swollen. Some insects can spread disease to people with a bite. However, most insect bites do not lead to disease, and most are not serious.  What are the causes?  Insects may bite for many reasons, including:  Hunger. To defend themselves.Insects that bite include:  Spiders. Mosquitoes. Ticks. Fleas. Ants. Flies. Kissing bugs. Chiggers.What are the signs or symptoms?  Symptoms of this condition include:  Itching or pain in the bite area. Redness and swelling in the bite area. An open wound (skin ulcer).Symptoms often last for 2–4 days.  In rare cases, a person may have a very bad allergic reaction (anaphylactic reaction) to a bite. Symptoms of an anaphylactic reaction may include:  Feeling warm in the face (flushed). Your face may turn red.Itchy, red, swollen areas of skin (hives).Swelling of the:  Eyes.Lips.Face.Mouth.Tongue.Throat.Trouble with any of these:  Breathing.Talking.Swallowing.Loud breathing (wheezing).Feeling dizzy or light-headed.Passing out (fainting).Pain or cramps in your belly.Throwing up (vomiting).Watery poop (diarrhea).How is this treated?  Treatment is usually not needed. Symptoms often go away on their own. When treatment is needed, it may involve:  Putting a cream or lotion on the bite area. This helps with itching.Taking an antibiotic medicine. This treatment is needed if the bite area gets infected. Getting a tetanus shot, if you are not up to date on this vaccine. Putting ice on the affected area.Using medicines called antihistamines. This treatment may be needed if you have itching or an allergic reaction to the insect bite.Giving yourself a shot of medicine (epinephrine) using an auto-injector "pen" if you have an anaphylactic reaction to a bite. Your doctor will teach you how to use this pen.Follow these instructions at home:  Bite area care        Do not scratch the bite area.Keep the bite area clean and dry.Wash the bite area every day with soap and water as told by your doctor.Check the bite area every day for signs of infection. Check for:  Redness, swelling, or pain.Fluid or blood.Warmth.Pus or a bad smell.Managing pain, itching, and swelling        You may put any of these on the bite area as told by your doctor:  A paste made of baking soda and water.Cortisone cream.Calamine lotion.If told, put ice on the bite area.  Put ice in a plastic bag.Place a towel between your skin and the bag.Leave the ice on for 20 minutes, 2–3 times a day.General instructions     Apply or take over-the-counter and prescription medicines only as told by your doctor.If you were prescribed an antibiotic medicine, take or apply it as told by your doctor. Do not stop using the antibiotic even if your condition improves.Keep all follow-up visits as told by your doctor. This is important.How is this prevented?  To help you have a lower risk of insect bites:  When you are outside, wear clothing that covers your arms and legs.Use insect repellent. The best insect repellents contain one of these:  DEET.Picaridin.Oil of lemon eucalyptus (OLE).WZ0019.Consider spraying your clothing with a pesticide called permethrin. Permethrin helps prevent insect bites. It works for several weeks and for up to 5–6 clothing washes. Do not apply permethrin directly to the skin.If your home windows do not have screens, think about putting some in.If you will be sleeping in an area where there are mosquitoes, consider covering your sleeping area with a mosquito net.Contact a doctor if:  You have redness, swelling, or pain in the bite area.You have fluid or blood coming from the bite area.The bite area feels warm to the touch.You have pus or a bad smell coming from the bite area.You have a fever.Get help right away if:  You have joint pain.You have a rash.You feel more tired or sleepy than you normally do.You have neck pain.You have a headache.You feel weaker than you normally do.You have signs of an anaphylactic reaction. Signs may include:  Feeling warm in the face.Itchy, red, swollen areas of skin.Swelling of your:  Eyes.Lips.Face.Mouth.Tongue.Throat.Trouble with any of these:  Breathing.Talking.Swallowing.Loud breathing.Feeling dizzy or light-headed.Passing out.Pain or cramps in your belly.Throwing up.Watery poop.These symptoms may be an emergency. Do not wait to see if the symptoms will go away. Do this right away:   Use your auto-injector pen as you have been told. Get medical help. Call your local emergency services (911 in the [place] Do not drive yourself to the hospital. Summary  An insect bite can make your skin red, itchy, and swollen.Treatment is usually not needed. Symptoms often go away on their own.Do not scratch the bite area. Keep it clean and dry.Ice can help with pain and itching from the bite.This information is not intended to replace advice given to you by your health care provider. Make sure you discuss any questions you have with your health care provider.

## 2020-07-11 NOTE — ED ADULT NURSE REASSESSMENT NOTE - NS ED NURSE REASSESS COMMENT FT1
Received pt from [facility]. PT resting comfortably in stretcher at this time. NAD noted. PT pending emergency housing.  Nathalie contacted for status of housing. At this time, still pending arrangements. NAD noted. CTM

## 2020-07-11 NOTE — ED PROVIDER NOTE - PHYSICAL EXAMINATION
unkept female dirty clothing, no acute physical or respiratory distress   heart rrr   lungs cta   abd soft nd nttp  skin: various bites/ulcerative lesions to arms and legs. no surrounding cellulitic changes no palpable abscesses.

## 2020-07-11 NOTE — ED PROVIDER NOTE - OBJECTIVE STATEMENT
43 yo female homeless was previously being treated for bug bites with bactrim and bactroban ointment over the last month presenting with request for mrsa testing as per paperwork for [facility] housing. pt states that she has been living out of a friends car. denies fevers chills no recent sick contacts or travel. denies new bites. pt denies hx of mrsa or being tested for mrsa. denies immunocompromise state, hx of hiv iv drug use etoh use or smoking.  pt presents paperwork from [facility].

## 2020-07-19 ENCOUNTER — EMERGENCY (EMERGENCY)
Facility: HOSPITAL | Age: 45
LOS: 0 days | Discharge: HOME | End: 2020-07-19
Attending: STUDENT IN AN ORGANIZED HEALTH CARE EDUCATION/TRAINING PROGRAM
Payer: MEDICAID

## 2020-07-19 VITALS
DIASTOLIC BLOOD PRESSURE: 54 MMHG | OXYGEN SATURATION: 99 % | WEIGHT: 149.91 LBS | HEIGHT: 63 IN | RESPIRATION RATE: 16 BRPM | HEART RATE: 55 BPM | TEMPERATURE: 98 F | SYSTOLIC BLOOD PRESSURE: 107 MMHG

## 2020-07-19 DIAGNOSIS — Z59.0 HOMELESSNESS: ICD-10-CM

## 2020-07-19 DIAGNOSIS — R21 RASH AND OTHER NONSPECIFIC SKIN ERUPTION: ICD-10-CM

## 2020-07-19 DIAGNOSIS — N39.0 URINARY TRACT INFECTION, SITE NOT SPECIFIED: ICD-10-CM

## 2020-07-19 PROCEDURE — 99283 EMERGENCY DEPT VISIT LOW MDM: CPT

## 2020-07-19 RX ORDER — MUPIROCIN 20 MG/G
1 OINTMENT TOPICAL ONCE
Refills: 0 | Status: COMPLETED | OUTPATIENT
Start: 2020-07-19 | End: 2020-07-19

## 2020-07-19 RX ADMIN — MUPIROCIN 1 APPLICATION(S): 20 OINTMENT TOPICAL at 20:02

## 2020-07-19 SDOH — ECONOMIC STABILITY - HOUSING INSECURITY: HOMELESSNESS: Z59.0

## 2020-07-19 NOTE — ED ADULT NURSE NOTE - OBJECTIVE STATEMENT
Pt A&Ox4 c/o scabies.  Pt reports hx of scabies, requesting medication cream refill.  Patient with multiple scabs to arms and legs.

## 2020-07-19 NOTE — ED STATDOCS - SKIN, MLM
excoriated liner rash to upper and lower extremity excoriated linear rash to upper and lower extremities b/l

## 2020-07-19 NOTE — ED STATDOCS - OBJECTIVE STATEMENT
43 y/o female presents to the ED requesting Bactroban for scabies. States month and half ago was at homeless shelter and learned another resident has scabies. For 1.5 month, went to 5 different hospital, had multiple vials of blood taken, and placed on multiple medication. Recently went to [facility], was given Keflex and Bactroban and states those were the only 2 meds that helped her but ran out of Bactroban, reports she needs to get it refilled. States rash is unchanged. Denies new food, medication or detergent. Pt is here to request Bactroban 45 y/o female presents to the ED requesting Bactroban for scabies. States 1 month and half ago was at homeless shelter and learned another resident had scabies. For 1.5 month, went to 5 different hospitals, had multiple vials of blood taken, and placed on multiple medications. Recently went to [facility], was given Keflex and Bactroban and states those were the only 2 meds that helped her but ran out of Bactroban, reports she needs to get it refilled. States rash is unchanged; not worse but improved with bactroban; rash gets worse without it per patient; Denies new food, medication or detergent. Pt is here to request Bactroban refill.

## 2020-07-19 NOTE — ED STATDOCS - PATIENT PORTAL LINK FT
You can access the FollowMyHealth Patient Portal offered by [facility] by registering at the following website: [email or web address]. By joining [employer]’s FollowMyHealth portal, you will also be able to view your health information using other applications (apps) compatible with our system.

## 2020-07-19 NOTE — ED ADULT NURSE NOTE - CHIEF COMPLAINT QUOTE
presents to ed complaining of scabies, as per patient she had dried up blisters, and scabies came out of them. as per md mcdonald, place patient in room, provide cream, patient from [facility]

## 2020-07-19 NOTE — ED ADULT TRIAGE NOTE - CHIEF COMPLAINT QUOTE
presents to ed complaining of scabies, as per patient she had dried up blisters, and scabies came out of them. as per md mcdonald, place patient in room, provide cream. presents to ed complaining of scabies, as per patient she had dried up blisters, and scabies came out of them. as per md mcdonald, place patient in room, provide cream, patient from [facility]

## 2020-08-17 ENCOUNTER — EMERGENCY (EMERGENCY)
Facility: HOSPITAL | Age: 45
LOS: 1 days | End: 2020-08-17
Admitting: EMERGENCY MEDICINE
Payer: MEDICAID

## 2020-08-17 PROCEDURE — 99283 EMERGENCY DEPT VISIT LOW MDM: CPT

## 2020-08-20 NOTE — ED ADULT NURSE NOTE - AS SC [facility] [employer]
Group Topic: BH JENARO Activity Group    Date: 8/19/2020  Start Time:  7:45 PM  End Time:  8:30 PM  Facilitators: Dirk Johnson LCSW    Date: 8/19/20  Start Time:  7:45PM  End Time:  8:30PM  Facilitators: Pasquale  Focus: Check-out / Triggers / Concerns  Number in attendance: 7    Method: Group  Attendance: Present  Participation: Active  Patient Response: Attentive  Mood: Positive  Affect: Calm  Behavior/Socialization: Appropriate to group  Thought Process: Focused  Task Performance: Follows directions     Individual Patient Response to Group:  Patient checked out for the night noting no questions/concerns from today's group. Patient stated receiving benefit from tonight's discussion. Patient does not foresee any high-risk situations/triggers. Patient grateful for group and recovery.  Pasquale Johnson LCSW, [facility]  8/20/2020  This visit was performed via live interactive two-way video.    During their visit, the patient was informed that their consent to treat includes permission to submit claims to their insurance on their behalf for the services received.  Clinician Location: No information on file.    Patient Location: Home          
Group Topic: BH JENARO Education    Date: 8/19/2020  Start Time:  6:30 PM  End Time:  7:15 PM  Facilitators: Dirk Johnson LCSW    Date: 8/19/20  Start Time:  6:30PM  End Time:  7:15PM  Facilitators: Pasquale  Focus: Healthy Boundaries  Number in attendance: 8    Description of group: Patient attended Boundaries group. Educated on concepts of boundaries types, areas where boundaries are established, examples of unhealthy boundaries, and ways to establish healthy boundaries. Educated on importance of creating boundaries in early recovery as relationships may have changed. Educated that boundaries are as important for the patient as they are for the individual involved.     Method: Group  Attendance: Present  Participation: Active  Patient Response: Attentive  Mood: Positive  Affect: Calm  Behavior/Socialization: Appropriate to group  Thought Process: Focused  Task Performance: Follows directions     Individual Patient Response to Group:  Patient identified with group topic. Patient reflected on how boundaries were not put in place during active addiction. Patient reflected on childhood boundaries and how they either remained or changed during adulthood. Patient processed ways to establish healthy boundaries with current relationships and discussed feelings it brings as a result.   Pasquale Johnson LCSW, [facility]  8/20/2020  This visit was performed via live interactive two-way video.    During their visit, the patient was informed that their consent to treat includes permission to submit claims to their insurance on their behalf for the services received.  Clinician Location: No information on file.    Patient Location: Home          
Group Topic: BH JENARO Process Group    Date: 8/19/2020  Start Time:  5:30 PM  End Time:  6:15 PM  Facilitators: Dirk Johnson LCSW    Focus: Check-in / Process  Number in attendance: 8      Since Last Treatment Group: (New use = enter substance & date)  Any New Substance Use: yn: No  Any Change in Medication(s): yn: No  Attend Support Group(s): yn: No    Quality of sleep: G, F, P: Good    Symptom Severity:   Cravings: severity: None  Depression : severity: Mild  Anxiety: severity: Mild  SI/HI: severity: None    Progress & Condition:   Progress Toward Goals: prog: good progress  Patient Overall Condition: condition: good    Additional Notes: Patient notes good day sleeping in until 8am, had lunch with wife, and is managing at work. Patient talked about anxiety over going into work to get supplies with the health situation going on.     Staff: Pasquale Johnson LCSW, [facility]  8/20/2020      Virtual Visit Statement:   This visit was performed via live interactive two-way video.    During their visit, the patient was informed that their consent to treat includes permission to submit claims to their insurance on their behalf for the services received.  Clinician Location: [facility]    Patient Location: Home             
(4) no limitation

## 2020-09-02 ENCOUNTER — EMERGENCY (EMERGENCY)
Facility: HOSPITAL | Age: 45
LOS: 1 days | End: 2020-09-02
Admitting: EMERGENCY MEDICINE
Payer: MEDICAID

## 2020-09-02 PROCEDURE — 99283 EMERGENCY DEPT VISIT LOW MDM: CPT

## 2020-09-17 ENCOUNTER — EMERGENCY (EMERGENCY)
Facility: HOSPITAL | Age: 45
LOS: 1 days | End: 2020-09-17
Admitting: EMERGENCY MEDICINE
Payer: MEDICAID

## 2020-09-17 PROCEDURE — 99283 EMERGENCY DEPT VISIT LOW MDM: CPT

## 2020-09-23 NOTE — ED PROVIDER NOTE - NS ED SCRIBE STATEMENT
Attending
Detail Level: Simple
Additional Notes: Discussed options such as OCPs, Spironolactone and isotretinoin. Mom is a little hesitant. Will provide written Spironolactone patient handout. Patient started menses 3 yrs ago.

## 2020-09-25 ENCOUNTER — EMERGENCY (EMERGENCY)
Facility: HOSPITAL | Age: 45
LOS: 1 days | End: 2020-09-25
Admitting: EMERGENCY MEDICINE
Payer: MEDICAID

## 2020-09-25 PROCEDURE — 70450 CT HEAD/BRAIN W/O DYE: CPT | Mod: 26

## 2020-09-25 PROCEDURE — 99285 EMERGENCY DEPT VISIT HI MDM: CPT

## 2020-10-05 NOTE — ED STATDOCS - NS_ATTENDINGSCRIBE_ED_ALL_ED
Pt is eligible for onsite nurse care coordination through her employer, [employer]. Onsite nurse reached out to patient via email to update patient on times/modes to communicate with [facility] during the COVID-19 pandemic. [facility] also provided Zoom Video Visit information. Patient encouraged to contact [facility] with any questions/concerns.     I personally performed the service described in the documentation recorded by the scribe in my presence, and it accurately and completely records my words and actions.

## 2020-10-08 ENCOUNTER — EMERGENCY (EMERGENCY)
Facility: HOSPITAL | Age: 45
LOS: 1 days | End: 2020-10-08
Admitting: EMERGENCY MEDICINE
Payer: MEDICAID

## 2020-10-08 PROCEDURE — 99283 EMERGENCY DEPT VISIT LOW MDM: CPT

## 2020-10-11 ENCOUNTER — EMERGENCY (EMERGENCY)
Facility: HOSPITAL | Age: 45
LOS: 1 days | End: 2020-10-11
Admitting: EMERGENCY MEDICINE
Payer: MEDICAID

## 2020-10-11 PROCEDURE — 99283 EMERGENCY DEPT VISIT LOW MDM: CPT

## 2020-10-13 ENCOUNTER — EMERGENCY (EMERGENCY)
Facility: HOSPITAL | Age: 45
LOS: 1 days | End: 2020-10-13
Admitting: EMERGENCY MEDICINE
Payer: MEDICAID

## 2020-10-13 PROCEDURE — 93010 ELECTROCARDIOGRAM REPORT: CPT

## 2020-10-13 PROCEDURE — 99284 EMERGENCY DEPT VISIT MOD MDM: CPT

## 2020-10-13 PROCEDURE — 74177 CT ABD & PELVIS W/CONTRAST: CPT | Mod: 26

## 2020-10-21 ENCOUNTER — EMERGENCY (EMERGENCY)
Facility: HOSPITAL | Age: 45
LOS: 1 days | End: 2020-10-21
Admitting: EMERGENCY MEDICINE
Payer: MEDICAID

## 2020-10-21 PROCEDURE — 99283 EMERGENCY DEPT VISIT LOW MDM: CPT

## 2021-09-02 ENCOUNTER — EMERGENCY (EMERGENCY)
Facility: HOSPITAL | Age: 46
LOS: 1 days | End: 2021-09-02
Admitting: EMERGENCY MEDICINE
Payer: MEDICAID

## 2021-09-02 PROCEDURE — 99283 EMERGENCY DEPT VISIT LOW MDM: CPT

## 2021-09-08 ENCOUNTER — EMERGENCY (EMERGENCY)
Facility: HOSPITAL | Age: 46
LOS: 1 days | End: 2021-09-08
Admitting: EMERGENCY MEDICINE
Payer: SELF-PAY

## 2021-09-08 PROCEDURE — L9992: CPT

## 2021-09-21 ENCOUNTER — EMERGENCY (EMERGENCY)
Facility: HOSPITAL | Age: 46
LOS: 1 days | End: 2021-09-21
Admitting: EMERGENCY MEDICINE
Payer: MEDICAID

## 2021-09-21 PROCEDURE — L9991: CPT

## 2021-09-24 ENCOUNTER — EMERGENCY (EMERGENCY)
Facility: HOSPITAL | Age: 46
LOS: 1 days | End: 2021-09-24
Admitting: EMERGENCY MEDICINE
Payer: MEDICAID

## 2021-09-24 PROCEDURE — 99283 EMERGENCY DEPT VISIT LOW MDM: CPT

## 2021-10-07 ENCOUNTER — EMERGENCY (EMERGENCY)
Facility: HOSPITAL | Age: 46
LOS: 1 days | End: 2021-10-07
Admitting: EMERGENCY MEDICINE
Payer: MEDICAID

## 2021-10-07 PROCEDURE — 99284 EMERGENCY DEPT VISIT MOD MDM: CPT

## 2021-10-07 PROCEDURE — 70450 CT HEAD/BRAIN W/O DYE: CPT | Mod: 26

## 2021-10-21 ENCOUNTER — EMERGENCY (EMERGENCY)
Facility: HOSPITAL | Age: 46
LOS: 1 days | End: 2021-10-21
Admitting: EMERGENCY MEDICINE
Payer: MEDICAID

## 2021-10-21 PROCEDURE — 99284 EMERGENCY DEPT VISIT MOD MDM: CPT

## 2021-11-11 ENCOUNTER — EMERGENCY (EMERGENCY)
Facility: HOSPITAL | Age: 46
LOS: 1 days | End: 2021-11-11
Admitting: EMERGENCY MEDICINE
Payer: MEDICAID

## 2021-11-11 PROCEDURE — 99283 EMERGENCY DEPT VISIT LOW MDM: CPT

## 2021-12-01 ENCOUNTER — EMERGENCY (EMERGENCY)
Facility: HOSPITAL | Age: 46
LOS: 1 days | Discharge: HOME | End: 2021-12-01
Attending: EMERGENCY MEDICINE
Payer: COMMERCIAL

## 2021-12-01 VITALS
DIASTOLIC BLOOD PRESSURE: 58 MMHG | HEART RATE: 61 BPM | RESPIRATION RATE: 18 BRPM | OXYGEN SATURATION: 98 % | SYSTOLIC BLOOD PRESSURE: 98 MMHG | HEIGHT: 63 IN | TEMPERATURE: 98 F

## 2021-12-01 PROBLEM — N39.0 URINARY TRACT INFECTION, SITE NOT SPECIFIED: Chronic | Status: ACTIVE | Noted: 2017-02-18

## 2021-12-01 PROCEDURE — 99283 EMERGENCY DEPT VISIT LOW MDM: CPT

## 2021-12-01 RX ORDER — MUPIROCIN 20 MG/G
1 OINTMENT TOPICAL
Qty: 30 | Refills: 0
Start: 2021-12-01 | End: 2021-12-10

## 2021-12-01 RX ORDER — CEPHALEXIN 500 MG
1 CAPSULE ORAL
Qty: 28 | Refills: 0
Start: 2021-12-01 | End: 2021-12-07

## 2021-12-01 NOTE — ED PROVIDER NOTE - CROS ED RESP ALL NEG
negative...
pt with sub acute/chronic wound to right foot sent in for operative procedure tomorrow. pre op labs, abx, admission.

## 2021-12-01 NOTE — ED PROVIDER NOTE - CARE PROVIDER_API CALL
Lianna Lo)  Dermatology  [address] [place] [address]  Phone: [phone]  Fax: [phone]  Follow Up Time:

## 2021-12-01 NOTE — ED PROVIDER NOTE - NSICDXPASTMEDICALHX_GEN_ALL_CORE_FT
PAST MEDICAL HISTORY:  No pertinent past medical history     UTI (urinary tract infection) Treated with Keflex currently

## 2021-12-01 NOTE — ED PROVIDER NOTE - ATTENDING CONTRIBUTION TO CARE
Jamal: I performed a face to face bedside interview with patient regarding history of present illness, review of symptoms and past medical history. I completed an independent physical exam.  I have discussed patient's plan of care with advanced care provider.   I agree with note as stated above including HISTORY OF PRESENT ILLNESS, HIV, PAST MEDICAL/SURGICAL/FAMILY/SOCIAL HISTORY, ALLERGIES AND HOME MEDICATIONS, REVIEW OF SYSTEMS, PHYSICAL EXAM, MEDICAL DECISION MAKING and any PROGRESS NOTES during the time I functioned as the attending physician for this patient  unless otherwise noted. My brief assessment is as follows: hx psych, skin lesions with multiple visits in past her erequestin gbactroban for scabbed rashes to legs/arms. no f/c, no other complaints. with erythamtous based lesion with dark central scabbed ulcers to extremities ~1 cm. no streaking erythema. will prescribe bactroban and keflex. reteurn precautions.

## 2021-12-01 NOTE — ED PROVIDER NOTE - OBJECTIVE STATEMENT
44 y/o female presents c/o b/l lower extremity wounds from "bug bites". PT is requesting Bactroban ointment. She reports a long history of these wounds and only Bactroban works.

## 2021-12-01 NOTE — ED PROVIDER NOTE - NSFOLLOWUPINSTRUCTIONS_ED_ALL_ED_FT
Cellulitis    Cellulitis is a skin infection caused by bacteria. This condition occurs most often in the arms and lower legs but can occur anywhere over the body. Symptoms include redness, swelling, warm skin, tenderness, and chills/fever. If you were prescribed an antibiotic medicine, take it as told by your health care provider. Do not stop taking the antibiotic even if you start to feel better.    SEEK IMMEDIATE MEDICAL CARE IF YOU HAVE ANY OF THE FOLLOWING SYMPTOMS: worsening fever, red streaks coming from affected area, vomiting or diarrhea, or dizziness/lightheadedness.     Please follow up with your doctor within 24 hours  Please follow up with dermatology within 48 hours.

## 2021-12-01 NOTE — ED PROVIDER NOTE - PHYSICAL EXAMINATION
Skin: + multiple scabbed over/ulcerated skin wounds to b/l lower ext, some wounds noted with scant surrounding erythema, no lower ext edema, no calf tenderness to palpation, no discharge from wound sites.

## 2021-12-01 NOTE — ED PROVIDER NOTE - PATIENT PORTAL LINK FT
You can access the FollowMyHealth Patient Portal offered by [facility] by registering at the following website: [email or web address]. By joining [employer]’s FollowMyHealth portal, you will also be able to view your health information using other applications (apps) compatible with our system.

## 2022-04-26 ENCOUNTER — EMERGENCY (EMERGENCY)
Facility: HOSPITAL | Age: 47
LOS: 1 days | Discharge: HOME | End: 2022-04-26
Admitting: EMERGENCY MEDICINE
Payer: MEDICAID

## 2022-04-26 DIAGNOSIS — R07.9 CHEST PAIN, UNSPECIFIED: ICD-10-CM

## 2022-04-26 DIAGNOSIS — D64.9 ANEMIA, UNSPECIFIED: ICD-10-CM
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
## 2022-04-26 DIAGNOSIS — K80.80 OTHER CHOLELITHIASIS WITHOUT OBSTRUCTION: ICD-10-CM

## 2022-04-26 DIAGNOSIS — Z59.00 HOMELESSNESS UNSPECIFIED: ICD-10-CM
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
## 2022-04-26 DIAGNOSIS — N20.0 CALCULUS OF KIDNEY: ICD-10-CM
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
## 2022-04-26 DIAGNOSIS — S20.212A CONTUSION OF LEFT FRONT WALL OF THORAX, INITIAL ENCOUNTER: ICD-10-CM
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
## 2022-04-26 PROCEDURE — 99284 EMERGENCY DEPT VISIT MOD MDM: CPT

## 2022-04-26 SDOH — ECONOMIC STABILITY - HOUSING INSECURITY: HOMELESSNESS UNSPECIFIED: Z59.00

## 2022-04-27 PROCEDURE — 71250 CT THORAX DX C-: CPT | Mod: 26,MA

## 2022-04-27 PROCEDURE — 74176 CT ABD & PELVIS W/O CONTRAST: CPT | Mod: 26,MA

## 2022-05-17 ENCOUNTER — EMERGENCY (EMERGENCY)
Facility: HOSPITAL | Age: 47
LOS: 1 days | Discharge: HOME | End: 2022-05-17
Admitting: EMERGENCY MEDICINE
Payer: MEDICAID
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
## 2022-05-17 DIAGNOSIS — Z04.3 ENCOUNTER FOR EXAMINATION AND OBSERVATION FOLLOWING OTHER ACCIDENT: ICD-10-CM

## 2022-05-17 DIAGNOSIS — S22.32XA FRACTURE OF ONE RIB, LEFT SIDE, INITIAL ENCOUNTER FOR CLOSED FRACTURE: ICD-10-CM

## 2022-05-17 DIAGNOSIS — R10.9 UNSPECIFIED ABDOMINAL PAIN: ICD-10-CM
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
## 2022-05-17 PROCEDURE — 99285 EMERGENCY DEPT VISIT HI MDM: CPT

## 2022-05-17 PROCEDURE — 74177 CT ABD & PELVIS W/CONTRAST: CPT | Mod: 26,MA

## 2022-05-17 PROCEDURE — 71260 CT THORAX DX C+: CPT | Mod: 26,MA

## 2022-05-17 PROCEDURE — 72128 CT CHEST SPINE W/O DYE: CPT | Mod: 26,MA

## 2022-05-17 PROCEDURE — 72131 CT LUMBAR SPINE W/O DYE: CPT | Mod: 26,MA

## 2022-06-17 NOTE — ED ADULT NURSE REASSESSMENT NOTE - STATUS
[address] [place] [address] ENCOUNTER      Pt Name: Laz Leon  MRN: [id number]  [place] 1951  Date of evaluation: 6/17/2022  Provider: Darlyn Puri, [address] [place] [address]       Chief Complaint   Patient presents with    Positive For Covid-19     pt sent in by doctor to get xray due to persistant cough and to get pulse ox checked         HISTORY OF PRESENT ILLNESS   (Location/Symptom, Timing/Onset, Context/Setting, Quality, Duration, Modifying Factors, Severity)  Note limiting factors. Laz Leon is a 69 y.o. female who presents to the emergency department with a complaint of a cough that began on Wednesday, 2 days ago. She states that her  was recently diagnosed with COVID several days ago. She had a negative COVID test on Tuesday. However, the following day she developed cough. She denies any chest pain heaviness pressure or tightness but she does report some cough paroxysms. She denies any history of asthma or COPD. She does not smoke. She does have a history of type 2 diabetes and takes insulin. Blood sugar today was 223 and she did corrections. She denies any headache, vision change, speech change, sore throat, rhinorrhea, earache, neck pain or stiffness. She denies any chest pain heaviness pressure or tightness. She denies any shortness of breath, diaphoresis or dyspnea on exertion. She denies any abdominal pain nausea vomiting or diarrhea. She denies any dysuria hematuria frequency urgency. She denies any documented fever or chills. She denies any personal or family history of thromboembolic disease. She denies any leg or calf pain. No recent travel. She saw her primary care physician today and had a positive COVID test.  He advised her to come to the emergency department to get a prescription for Paxil of it and get a chest x-ray to make sure that she does not have COVID-pneumonia.     Nursing Notes were awaiting transfer, no change reviewed. HPI        REVIEW OF SYSTEMS    (2-9 systems for level 4, 10 or more for level 5)       Constitutional: Negative for fever or chills. HENT: Negative for rhinorrhea and sore throat. Eyes: Negative for redness or drainage. Respiratory: Negative for shortness of breath or dyspnea on exertion. Cardiovascular: Negative for chest pain. Gastrointestinal: Negative for abdominal pain. Negative for vomiting or diarrhea. Genitourinary: Negative for flank pain. Negative for dysuria. Negative for hematuria. Neurological: Negative for headache. All systems are reviewed and are negative except for those listed above in the history of present illness and ROS. PAST MEDICAL HISTORY     Past Medical History:   Diagnosis Date    Anxiety     DM (diabetes mellitus), type 1 ([facility] [address]     Osteoarthritis     Urinary incontinence          SURGICAL HISTORY       Past Surgical History:   Procedure Laterality Date    [address]EN[address]OMY  1968   [address]    2    COLONOSCOPY  4/23/2015    normal-ghastine    GALLBLADDER SURGERY  1996    KNEE ARTHROSCOPY Left 2001    NH REPAIR MAJOR PERIPHERAL NERVE Left 8/22/2019    LEFT ULNAR NERVE DECOMPRESSION (AT ELBOW) performed by Tyler Solis MD at [address] Left 08/22/2019    LEFT ULNAR NERVE DECOMPRESSION (AT ELBOW) (Left )         CURRENT MEDICATIONS       Previous Medications    ASPIRIN 81 MG TABLET    Take 81 mg by mouth as needed 3 times a week    ATORVASTATIN (LIPITOR) 20 MG TABLET    Take 20 mg by mouth every evening     CONTINUOUS BLOOD GLUC SENSOR (DEXCOM G6 SENSOR) MISC    1 each Every 10 days    CONTINUOUS BLOOD GLUC TRANSMIT (DEXCOM G6 TRANSMITTER) MISC    1 EACH SEE INSTRUCTIONS. CHANGE DEVICE EVERY 3 MONTHS.     DICLOFENAC SODIUM (VOLTAREN) 1 % GEL    Apply 4 g topically 4 times daily    EVISTA 60 MG TABLET    TAKE 1 TABLET AT BEDTIME    FLUOCINONIDE (LIDEX) 0.05 % EXTERNAL SOLUTION        INSULIN ASPART (NOVOLOG) 100 UNIT/ML INJECTION VIAL    Inject 125 Units into the skin daily    NAPROXEN (NAPROSYN) 500 MG TABLET    Take 1 tablet by mouth 2 times daily (with meals)    THERAPEUTIC MULTIVITAMIN-MINERALS (THERAGRAN-M) TABLET    Take 1 tablet by mouth daily. ALLERGIES     Imuran [azathioprine], Sulfa antibiotics, and Codeine    FAMILY HISTORY       Family History   Problem Relation Age of Onset    Cancer Mother         Tigress leukemia    Colon Cancer Father     Heart Disease Maternal Grandmother     Heart Disease Maternal Grandfather     Heart Attack Maternal Grandfather     Arthritis Paternal Grandmother           SOCIAL HISTORY       Social History     Socioeconomic History    Marital status:      Spouse name: None    Number of children: 2    Years of education: None    Highest education level: None   Occupational History    Occupation: Retired     Comment: elementary school   Tobacco Use    Smoking status: Never Smoker    Smokeless tobacco: Never Used   Vaping Use    Vaping Use: Never used   Substance and Sexual Activity    Alcohol use: No    Drug use: No    Sexual activity: Yes     Partners: Female, Male   Other Topics Concern    None   Social History Narrative    None     Social Determinants of Health     Financial Resource Strain:     Difficulty of Paying Living Expenses: Not on file   Food Insecurity:     Worried About Running Out of Food in the Last Year: Not on file    [place]gil of Food in the Last Year: Not on file   Transportation Needs:     Lack of Transportation (Medical): Not on file    Lack of Transportation (Non-Medical):  Not on file   Physical Activity:     Days of Exercise per Week: Not on file    Minutes of Exercise per Session: Not on file   Stress:     Feeling of Stress : Not on file   Social Connections:     Frequency of Communication with Friends and Family: Not on file    Frequency of Social Gatherings with Friends and Family: Not on file   [facility] Attends [employer] Services: Not on file    Active Member of Clubs or Organizations: Not on file    Attends Club or Organization Meetings: Not on file    Marital Status: Not on file   Intimate Partner Violence:     Fear of Current or Ex-Partner: Not on file    Emotionally Abused: Not on file    Physically Abused: Not on file    Sexually Abused: Not on file   Housing Stability:     Unable to Pay for Housing in the Last Year: Not on file    Number of [place] in the Last Year: Not on file    Unstable Housing in the Last Year: Not on file       SCREENINGS    [place] Coma Scale  Eye Opening: Spontaneous  Best Verbal Response: Oriented  Best Motor Response: Obeys commands  [place] Coma Scale Score: 15        PHYSICAL EXAM    (up to 7 for level 4, 8 or more for level 5)     ED Triage Vitals [06/17/22 1259]   BP Temp Temp Source Heart Rate Resp SpO2 Height Weight   (!) 143/72 98.1 °F (36.7 °C) Oral (!) 105 16 98 % 5' 5.5\" (1.664 m) 158 lb 11.7 oz (72 kg)         Physical Exam   Constitutional: Awake and alert. Very pleasant. Not ill-appearing other than an occasional cough. Cough is dry and nonproductive. Head: No visible evidence of trauma. Normocephalic. Eyes: Pupils equal and reactive. No photophobia. Conjunctiva normal.    HENT: Oral mucosa moist.  Airway patent. Pharynx without erythema. Nares were clear. Neck:  Soft and supple. Nontender. Heart:  Regular rate and rhythm. No murmur. Lungs:  Clear to auscultation. No wheezes, rales, or ronchi. No conversational dyspnea or accessory muscle use. Chest: Chest wall non-tender. No evidence of trauma. Abdomen:  Soft, nondistended, bowel sounds present. Nontender. No guarding rigidity or rebound. No masses. Musculoskeletal: Extremities non-tender with full range of motion. Radial and dorsalis pedis pulses were intact. No calf tenderness erythema or edema. Neurological: Alert and oriented x 3. Speech clear. Cranial nerves II-XII intact. No facial droop. No acute focal motor or sensory deficits. Skin: Skin is warm and dry. No rash. Lymphatic:  No lympadenopathy. Psychiatric: Normal mood and affect. Behavior is normal.         DIAGNOSTIC RESULTS     EKG: All EKG's are interpreted by the Emergency Department Physician who either signs or Co-signs this chart in the absence of a cardiologist.        RADIOLOGY:   Non-plain film images such as CT, Ultrasound and MRI are read by the radiologist. Plain radiographic images are visualized and preliminarily interpreted by the emergency physician with the below findings:        Interpretation per the Radiologist below, if available at the time of this note:    XR CHEST PORTABLE   Final Result   No acute cardiopulmonary disease               ED BEDSIDE ULTRASOUND:   Performed by ED Physician - none    LABS:  Labs Reviewed - No data to display    All other labs were within normal range or not returned as of this dictation. EMERGENCY DEPARTMENT COURSE and DIFFERENTIAL DIAGNOSIS/MDM:   Vitals:    Vitals:    06/17/22 1259 06/17/22 1322   BP: (!) 143/72    Pulse: (!) 105 98   Resp: 16    Temp: 98.1 °F (36.7 °C)    TempSrc: Oral    SpO2: 98% 95%   Weight: 158 lb 11.7 oz (72 kg)    Height: 5' 5.5\" (1.664 m)          MDM      The patient presents with a cough that began 2 days ago on Wednesday. She did test positive for COVID today. Currently she is hemodynamically stable. She is afebrile. Oxygen saturation is 99% on room air at the time of my exam.  Heart rate is 95. She is well-appearing. Blood sugar today was 223. No associated nausea or vomiting. She is eating and drinking appropriately. Chest x-ray was obtained. She requested a prescription for Paxlovid. She is at risk of developing severe symptoms based on underlying history of diabetes and age. She does qualify for Paxlovid. REASSESSMENT          2:05 PM: Chest x-ray was reviewed. No active disease. No infiltrates.   No evidence of COVID-pneumonia. The patient is stable for discharge and outpatient management. Will be given a prescription for Phenergan and Tessalon. You are advised to self isolate for 10 days from onset of symptoms or until COVID-19 test is known to be negative. You are advised to stay home and do not leave the house unless absolutely necessary. If you do need to leave the house for an urgent reason, you must wear a mask at all times. If possible, check your oxygen saturations twice daily with a hand-held pulse oximeter. If oxygen saturation readings are less than 92%, you should return to the emergency department. Follow-up with a primary care physician by telephone within 1-2 business days to discuss whether or not you need a in person follow-up visit. Make sure that you wear a mask if a follow-up visit is required. Make sure that you review the COVID-19 isolation instructions and COVID-19 general instructions that are attached. Watch for chest pain, shortness of breath, or worsening symptoms. If condition worsens or new symptoms develop, return immediately to the emergency department. Drink plenty of fluids. Recommend Tylenol or ibuprofen as directed for pain or fever. CRITICAL CARE TIME   Total Critical Care time was 0 minutes, excluding separately reportable procedures. There was a high probability of clinically significant/life threatening deterioration in the patient's condition which required my urgent intervention.       CONSULTS:  None    PROCEDURES:  Unless otherwise noted below, none     Procedures        FINAL IMPRESSION      1. COVID-19          DISPOSITION/PLAN   DISPOSITION        PATIENT REFERRED TO:  Asmita Martinez MD  [facility]  [phone]    Call today        DISCHARGE MEDICATIONS:  New Prescriptions    BENZONATATE (TESSALON PERLES) 100 MG CAPSULE    Take 1 capsule by mouth 3 times daily as needed for Cough    NIRMATRELVIR/RITONAVIR (PAXLOVID) 20 X 150 MG & 10 X 100MG    Take 3 tablets (two 150 mg nirmatrelvir and one 100 mg ritonavir tablets) by mouth every 12 hours for 5 days. PROMETHAZINE (PHENERGAN) 25 MG TABLET    Take 1 tablet by mouth every 6 hours as needed for Nausea WARNING:  May cause drowsiness. May impair ability to operate vehicles or machinery. Do not use in combination with alcohol. Controlled Substances Monitoring:     No flowsheet data found. (Please note that portions of this note were completed with a voice recognition program.  Efforts were made to edit the dictations but occasionally words are mis-transcribed. )    [address],  (electronically signed)  Attending Emergency Physician          Jeb Figueroa DO  06/17/22 1408

## 2022-07-22 ENCOUNTER — EMERGENCY (EMERGENCY)
Facility: HOSPITAL | Age: 47
LOS: 1 days | Discharge: HOME | End: 2022-07-22
Attending: EMERGENCY MEDICINE
Payer: COMMERCIAL

## 2022-07-22 VITALS
SYSTOLIC BLOOD PRESSURE: 128 MMHG | TEMPERATURE: 99 F | HEIGHT: 63 IN | RESPIRATION RATE: 18 BRPM | HEART RATE: 78 BPM | DIASTOLIC BLOOD PRESSURE: 77 MMHG | OXYGEN SATURATION: 98 %

## 2022-07-22 PROCEDURE — 99283 EMERGENCY DEPT VISIT LOW MDM: CPT

## 2022-07-22 PROCEDURE — 96372 THER/PROPH/DIAG INJ SC/IM: CPT

## 2022-07-22 RX ORDER — HYDROCORTISONE 1 %
1 OINTMENT (GRAM) TOPICAL
Qty: 10 | Refills: 0
Start: 2022-07-22 | End: 2022-07-26

## 2022-07-22 RX ORDER — DIPHENHYDRAMINE HCL 50 MG
1 CAPSULE ORAL
Qty: 16 | Refills: 0
Start: 2022-07-22 | End: 2022-07-25

## 2022-07-22 RX ORDER — MUPIROCIN 20 MG/G
1 OINTMENT TOPICAL ONCE
Refills: 0 | Status: COMPLETED | OUTPATIENT
Start: 2022-07-22 | End: 2022-07-22

## 2022-07-22 RX ORDER — CEPHALEXIN 500 MG
500 CAPSULE ORAL ONCE
Refills: 0 | Status: COMPLETED | OUTPATIENT
Start: 2022-07-22 | End: 2022-07-22

## 2022-07-22 RX ORDER — DIPHENHYDRAMINE HCL 50 MG
25 CAPSULE ORAL ONCE
Refills: 0 | Status: COMPLETED | OUTPATIENT
Start: 2022-07-22 | End: 2022-07-22

## 2022-07-22 RX ADMIN — MUPIROCIN 1 APPLICATION(S): 20 OINTMENT TOPICAL at 22:49

## 2022-07-22 RX ADMIN — Medication 25 MILLIGRAM(S): at 21:42

## 2022-07-22 NOTE — ED PROVIDER NOTE - NSFOLLOWUPINSTRUCTIONS_ED_ALL_ED_FT
Follow up with PCP within 1 week   Follow up with dermatology within 1 week     return if new or worsening symptoms     Rash    A rash is a change in the color of the skin. A rash can also change the way your skin feels. There are many different conditions and factors that can cause a rash, most of which are not dangerous. Make sure to follow up with your primary care physician or a dermatologist as instructed by your health care provider.    SEEK IMMEDIATE MEDICAL CARE IF YOU HAVE ANY OF THE FOLLOWING SYMPTOMS: fever, blisters, a rash inside your mouth, vaginal or anal pain, or altered mental status.

## 2022-07-22 NOTE — ED PROVIDER NOTE - NS ED ATTENDING STATEMENT MOD
This was a shared visit with the LEYDI. I reviewed and verified the documentation and independently performed the documented:

## 2022-07-22 NOTE — ED PROVIDER NOTE - OBJECTIVE STATEMENT
46 year old female with psych hx presents to ED c/o rash. as per chart review pt has been here many times for same complaint with different stories. Today pt states she has "chigas", slept in a bed at a shelter she believed has bugs. she is requesting bactroban, antibiotics and an injectable antihistamine. she denies n/v/d, pain, fever/chils, iv drug use

## 2022-07-22 NOTE — ED PROVIDER NOTE - PATIENT PORTAL LINK FT
You can access the FollowMyHealth Patient Portal offered by [facility] by registering at the following website: [email or web address]. By joining [employer]’s FollowMyHealth portal, you will also be able to view your health information using other applications (apps) compatible with our system.

## 2022-07-22 NOTE — ED PROVIDER NOTE - PHYSICAL EXAMINATION
pt well appearing in NAD   + erythematous macules to b/l arms in multiple stages of healing, w/ scarring, appear to be from chronic scratching   no linear burrowing to skin, no hand involvement

## 2022-09-13 ENCOUNTER — APPOINTMENT (OUTPATIENT)
Dept: INTERNAL MEDICINE | Facility: CLINIC | Age: 47
End: 2022-09-13

## 2022-10-12 ENCOUNTER — EMERGENCY (EMERGENCY)
Facility: HOSPITAL | Age: 47
LOS: 1 days | Discharge: HOME | End: 2022-10-12
Attending: EMERGENCY MEDICINE
Payer: COMMERCIAL

## 2022-10-12 VITALS
SYSTOLIC BLOOD PRESSURE: 118 MMHG | RESPIRATION RATE: 18 BRPM | WEIGHT: 169.98 LBS | HEIGHT: 63 IN | DIASTOLIC BLOOD PRESSURE: 69 MMHG | HEART RATE: 56 BPM | TEMPERATURE: 98 F | OXYGEN SATURATION: 100 %

## 2022-10-12 PROCEDURE — 73110 X-RAY EXAM OF WRIST: CPT

## 2022-10-12 PROCEDURE — 99283 EMERGENCY DEPT VISIT LOW MDM: CPT

## 2022-10-12 PROCEDURE — 73110 X-RAY EXAM OF WRIST: CPT | Mod: 26,RT

## 2022-10-12 RX ORDER — ACETAMINOPHEN 500 MG
650 TABLET ORAL ONCE
Refills: 0 | Status: COMPLETED | OUTPATIENT
Start: 2022-10-12 | End: 2022-10-12

## 2022-10-12 RX ADMIN — Medication 650 MILLIGRAM(S): at 22:50

## 2022-10-12 NOTE — ED STATDOCS - CPE ED MUSC NORM
[FreeTextEntry1] : GAIT: No limp. Good coordination and balance noted.\par GENERAL: alert, cooperative pleasant young 2 yo female in NAD\par SKIN: The skin is intact, warm, pink and dry over the area examined.\par EYES: Normal conjunctiva, normal eyelids and pupils were equal and round.\par ENT: normal ears, normal nose and normal lips.\par CARDIOVASCULAR: brisk capillary refill, but no peripheral edema.\par RESPIRATORY: The patient is in no apparent respiratory distress. They're taking full deep breaths without use of accessory muscles or evidence of audible wheezes or stridor without the use of a stethoscope. Normal respiratory effort.\par ABDOMEN: not examined  \par \par \par LUE: \par splint removed for examination \par Skin is intact and there is no breakdown or abrasion\par Mild proximal ulnar deformity/fullness noted \par fingers mobile\par sensation grossly intact\par no pain with passive stretch of the digits.\par brisk cap refill\par \par \par \par 
normal...

## 2022-10-12 NOTE — ED PROVIDER NOTE - OBJECTIVE STATEMENT
46 year old female with no med hx presented to ED c/o atraumatic wrist pain x 3 days. Pt states injured her wrist 20 years ago, was in a wrist brace. has never had pain since. states 3 days ago, has been having increased right wrist pain, has not been taking medications. states she is right hand dominant, has been playing the guitar more than usual. + tingling denies numbness, weakness, trauma/injury, fever/chills, swelling, erythema, warmth.

## 2022-10-12 NOTE — ED ADULT TRIAGE NOTE - CHIEF COMPLAINT QUOTE
Ambulatory reporting 3 days of atraumatic R wrist pain. Patient has not medicated for pain. No redness/swelling to the area.

## 2022-10-12 NOTE — ED PROVIDER NOTE - NSFOLLOWUPINSTRUCTIONS_ED_ALL_ED_FT
Follow up with orthopedics within 1 week   take tylenol for pain every 6 hours   Keep brace in place   Apply ice every 4-6 hours     return if new or worsening symptoms     Sprain    A sprain is a stretch or tear in one of the tough, fiber-like tissues (ligaments) in your body. This is caused by an injury to the area such as a twisting mechanism. Symptoms include pain, swelling, or bruising. Rest that area over the next several days and slowly resume activity when tolerated. Ice can help with swelling and pain.     SEEK IMMEDIATE MEDICAL CARE IF YOU HAVE ANY OF THE FOLLOWING SYMPTOMS: worsening pain, inability to move that body part, numbness or tingling.

## 2022-10-12 NOTE — ED PROVIDER NOTE - PATIENT PORTAL LINK FT
You can access the FollowMyHealth Patient Portal offered by [facility] by registering at the following website: [email or web address]. By joining [employer]’s FollowMyHealth portal, you will also be able to view your health information using other applications (apps) compatible with our system.

## 2022-10-12 NOTE — ED ADULT NURSE NOTE - OBJECTIVE STATEMENT
AxOx4. Patient ambulatory reporting 3 days of atraumatic R wrist pain. Patient has not taken any medication at home. No redness/swelling to the area. Full movement of (R) wrist noted. Medicated as per orders.

## 2022-10-12 NOTE — ED PROVIDER NOTE - NSFOLLOWUPCLINICS_GEN_ALL_ED_FT
[facility]  Orthopedics  [address] [place] [id number]  Phone: [phone]  Fax:

## 2023-02-02 NOTE — ED PROVIDER NOTE - PROVIDER TOKENS
Detail Level: Detailed PROCEDURES:  Bone marrow biopsy 02-Feb-2023 15:50:29  Gabrielle Cedillo  Bone marrow aspiration 02-Feb-2023 15:50:41  Gabrielle Cedillo   PROVIDER:[TOKEN:[[id number]]]

## 2023-02-14 NOTE — ED ADULT NURSE NOTE - PATIENT DISCHARGE SIGNATURE
Pt. Brought by mom feeling sick, having redness,discharges and crust on both eyes   Having fever 100+, tested positive for Covid as well and ahving SOB at times   Dx congested heart disease and mom been giving generic benadryl for runny nose   18-Feb-2017

## 2023-03-01 ENCOUNTER — EMERGENCY (EMERGENCY)
Facility: HOSPITAL | Age: 48
LOS: 0 days | Discharge: HOME | End: 2023-03-01
Attending: STUDENT IN AN ORGANIZED HEALTH CARE EDUCATION/TRAINING PROGRAM
Payer: MEDICAID

## 2023-03-01 VITALS
RESPIRATION RATE: 16 BRPM | TEMPERATURE: 98 F | WEIGHT: 160.06 LBS | SYSTOLIC BLOOD PRESSURE: 110 MMHG | OXYGEN SATURATION: 100 % | HEART RATE: 53 BPM | DIASTOLIC BLOOD PRESSURE: 70 MMHG

## 2023-03-01 DIAGNOSIS — L29.9 PRURITUS, UNSPECIFIED: ICD-10-CM

## 2023-03-01 DIAGNOSIS — N39.0 URINARY TRACT INFECTION, SITE NOT SPECIFIED: ICD-10-CM

## 2023-03-01 DIAGNOSIS — R39.89 OTHER SYMPTOMS AND SIGNS INVOLVING THE GENITOURINARY SYSTEM: ICD-10-CM

## 2023-03-01 DIAGNOSIS — R23.8 OTHER SKIN CHANGES: ICD-10-CM

## 2023-03-01 DIAGNOSIS — H02.846 EDEMA OF LEFT EYE, UNSPECIFIED EYELID: ICD-10-CM

## 2023-03-01 DIAGNOSIS — H02.843 EDEMA OF RIGHT EYE, UNSPECIFIED EYELID: ICD-10-CM
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
## 2023-03-01 DIAGNOSIS — S51.802A UNSPECIFIED OPEN WOUND OF LEFT FOREARM, INITIAL ENCOUNTER: ICD-10-CM
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
## 2023-03-01 DIAGNOSIS — M54.50 LOW BACK PAIN, UNSPECIFIED: ICD-10-CM

## 2023-03-01 PROCEDURE — 99284 EMERGENCY DEPT VISIT MOD MDM: CPT

## 2023-03-01 PROCEDURE — 99283 EMERGENCY DEPT VISIT LOW MDM: CPT

## 2023-03-01 RX ORDER — DIPHENHYDRAMINE HCL 50 MG
50 CAPSULE ORAL ONCE
Refills: 0 | Status: COMPLETED | OUTPATIENT
Start: 2023-03-01 | End: 2023-03-01

## 2023-03-01 RX ORDER — PERMETHRIN CREAM 5% W/W 50 MG/G
1 CREAM TOPICAL
Qty: 1 | Refills: 0
Start: 2023-03-01 | End: 2023-03-02

## 2023-03-01 RX ORDER — MUPIROCIN 20 MG/G
1 OINTMENT TOPICAL
Qty: 1 | Refills: 0
Start: 2023-03-01 | End: 2023-03-10

## 2023-03-01 RX ORDER — DIPHENHYDRAMINE HCL 50 MG
1 CAPSULE ORAL
Qty: 16 | Refills: 0
Start: 2023-03-01 | End: 2023-03-04

## 2023-03-01 RX ADMIN — Medication 1 TABLET(S): at 18:46

## 2023-03-01 RX ADMIN — Medication 50 MILLIGRAM(S): at 18:46

## 2023-03-01 NOTE — ED ADULT NURSE NOTE - CHIEF COMPLAINT QUOTE
pt presents to ED due to urinary complaints x 2 weeks said she was cleared by [facility] was told she has no UTI and sent home pt states her sxs are worsening pt comes from [facility]

## 2023-03-01 NOTE — ED STATDOCS - CLINICAL SUMMARY MEDICAL DECISION MAKING FREE TEXT BOX
Homeless female who is residing in a shelter presents with 2 weeks of urinary sx. I reviewed urine cultures from 2/19 which shows she is growing E. Coli that is sensitive to Augmentin. Pt's vitals are normal. No fever. Plan: send abx to her pharmacy. Additionally pt is concerned about nito lice at shelter, is having . Promethean lotion shampoo to pt's pharmacy and instructions on how to use it. Will send Benadryl to pharmacy for scalp itching. Pt is requesting Mupirocin cream for wound on L forearm that does not appear infected and is healing well. Will send Mupirocin cream to prevent infection. Pt is also c/o redness, swelling, and itching to b/l eye lids after starting new perfume consistent with allergic reaction. Will send Medrol dose pack to pharmacy.

## 2023-03-01 NOTE — ED STATDOCS - ATTENDING APP SHARED VISIT CONTRIBUTION OF CARE
I, Dharmesh Singh, DO personally saw the patient with LEYDI.  I have personally performed a face to face diagnostic evaluation on this patient.  I have reviewed the LEYDI note and agree with the history, exam, and plan of care, except as noted.  I personally saw the patient and performed a substantive portion of the visit including all aspects of the medical decision making.

## 2023-03-01 NOTE — ED STATDOCS - PATIENT PORTAL LINK FT
You can access the FollowMyHealth Patient Portal offered by [facility] by registering at the following website: [email or web address]. By joining [employer]’s FollowMyHealth portal, you will also be able to view your health information using other applications (apps) compatible with our system.

## 2023-03-01 NOTE — ED STATDOCS - OBJECTIVE STATEMENT
48 y/o female PMHx of UTI presents to ED from [facility] for bladder pain/pressure during urination with left lower back pain x 2 weeks. Pt reports she was cleared by [facility] 2 weeks ago, was told she has no UTI, and sent home. Urine culture from [place] accessed today shows pt is growing E. Coli but pt reports she never received a call about this and is not on any abx currently. Pt denies fever, hematuria, vomiting. Pt also reports "I think I acquired lice from [facility]" because her scalp and neck are itchy and have bumps. Pt also c/o eyelid swelling and open wounds on arms from bites. She reports she thinks this was from a new perfume which she stopped using. Pt reports she does not have a PCP because "I'm pretty healthy".

## 2023-03-01 NOTE — ED STATDOCS - NSTIMEPROVIDERCAREINITIATE_GEN_ER
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

## 2023-03-01 NOTE — ED ADULT TRIAGE NOTE - CHIEF COMPLAINT QUOTE
pt presents to ED due to urinary complaints x 2 weeks said she was cleared by [facility] pt presents to ED due to urinary complaints x 2 weeks said she was cleared by [facility] was told she has no UTI and sent home pt states her sxs are worsening pt comes from [facility]

## 2023-04-10 ENCOUNTER — NON-APPOINTMENT (OUTPATIENT)
Age: 48
End: 2023-04-10

## 2023-04-18 NOTE — ED STATDOCS - SKIN, MLM
[facility]  [facility]   Nephrology Progress Note     Patient: Roscoe Cosme               Sex: male           MRN: [id number]       YOB: 1949      Age:  73 year old                 Subjective:  Seen and examined this am , no major events overnight   Says he feels okay ,no Chest pain , no SOB   No nausea, no vomiting , no abd pain   No palpitations, no Fever , Tachycardic   Non Oliguric   Marginally elevated BP     Patient Active Problem List   Diagnosis   • Balance disorder   • Benign essential hypertension   • Bilateral edema of lower extremity   • Cervical radiculopathy   • Coronary artery disease involving native coronary artery of native heart without angina pectoris   • Diabetic macular edema of both eyes (CMD)   • Diabetic peripheral neuropathy associated with type 2 diabetes mellitus (CMD)   • Proliferative diabetic retinopathy of both eyes associated with type 2 diabetes mellitus (CMD)   • DM type 2 with diabetic mixed hyperlipidemia (CMD)   • Lower urinary tract symptoms (LUTS)   • History of lymphoma   • Numbness on right side   • Type 2 diabetes mellitus with stage 4 chronic kidney disease, with long-term current use of insulin (CMD)   • Prostate cancer (CMD)   • Obstructive sleep apnea syndrome   • Cerebrovascular disease, arteriosclerotic, post-stroke   • SLAP lesion of right shoulder   • Adjustment reaction with anxiety and depression   • Other constipation   • Primary osteoarthritis of right knee   • Class 1 obesity due to excess calories with serious comorbidity and body mass index (BMI) of 31.0 to 31.9 in adult   • Cigarette nicotine dependence without complication   • Lung nodules   • Duodenal ulcer disease   • Cessation of tobacco use in previous 12 months   • Ischemic cardiomyopathy   • Chronic combined systolic and diastolic congestive heart failure (CMD)   • Paroxysmal atrial fibrillation (CMD)   • ACP (advance care planning)   • Debility   •  Stage 4 chronic kidney disease (CMD)   • Nephrolithiasis   • Atherosclerosis of aortic arch (CMD)   • Secondary hyperparathyroidism of renal origin (CMD)   • Hypercoagulable state due to atrial fibrillation (CMD)   • Piriformis syndrome of right side   • Thrombus of atrial appendage   • Ischemic cardiomyopathy   • Hypertensive heart disease with chronic systolic congestive heart failure (CMD)   • Coronary artery disease involving native coronary artery of native heart without angina pectoris   • Current use of long term anticoagulation   • Sepsis (CMD)       Current Facility-Administered Medications   Medication Dose Route Frequency Provider Last Rate Last Admin   • insulin lispro (ADMELOG, HumaLOG) injection 2 Units  2 Units Subcutaneous TID  Agus Rosas, DO   2 Units at 04/17/23 1718   • insulin glargine (LANTUS) injection 30 Units  30 Units Subcutaneous Nightly Casimiro Simpson, DO   30 Units at 04/17/23 2250   • hydrALAZINE (APRESOLINE) tablet 50 mg  50 mg Oral 2 times per day Agus Rosas DO   50 mg at 04/17/23 2246   • cefepime (MAXIPIME) 1,000 mg in sodium chloride 0.9 % 100 mL IVPB  1,000 mg Intravenous Q24H Yordy Bernabe MD   Completed at 04/17/23 1752   • insulin lispro (ADMELOG,HumaLOG) - Correction Dose   Subcutaneous TID  Luis Garber MD   1 Units at 04/17/23 1718   • digoxin (LANOXIN) tablet 125 mcg  125 mcg Oral Every Other Day Luis Garber MD   125 mcg at 04/17/23 0904   • Potassium Replacement (Levels 3.6 - 4)   Does not apply See Admin Instructions LUISITO Small       • sodium chloride (PF) 0.9 % injection 2 mL  2 mL Intracatheter 2 times per day Maverick Hunt MD   2 mL at 04/17/23 2248   • atorvastatin (LIPITOR) tablet 40 mg  40 mg Oral Nightly LUISITO Small   40 mg at 04/17/23 2246   • clopidogrel (PLAVIX) tablet 75 mg  75 mg Oral Daily LUISITO Small   75 mg at 04/17/23 0904   • latanoprost (XALATAN) 0.005 % ophthalmic solution 1 drop  1 drop Both Eyes  Nightly LUISITO Small   1 drop at 23 2252   • metoPROLOL succinate (TOPROL-XL) ER tablet 100 mg  100 mg Oral BID LUISITO Small   100 mg at 23   • tamsulosin (FLOMAX) capsule 0.4 mg  0.4 mg Oral Nightly LUISITO Small   0.4 mg at 23   • WARFARIN - PHARMACIST MONITORED Misc   Does not apply See Admin Instructions LUISITO Small       • nicotine (NICODERM) 14 MG/24HR patch 1 patch  1 patch Transdermal Daily LUISITO Small   1 patch at 23 0911   • ipratropium-albuterol (DUONEB) 0.5-2.5 (3) MG/3ML nebulizer solution 3 mL  3 mL Nebulization 4x Daily Resp LUISITO Small   3 mL at 23 1941       Physical Exam:   PHYSICAL EXAM    Intake/Output Summary (Last 24 hours) at 2023 0704  Last data filed at 2023 0600  Gross per 24 hour   Intake --   Output 1350 ml   Net -1350 ml     Temp (24hrs), Av.9 °F (36.6 °C), Min:97.5 °F (36.4 °C), Max:98.6 °F (37 °C)    Vitals:    23 2119 23 2246 23 2341 23 0330   BP: (!) 140/74 139/82 (!) 162/81 (!) 159/76   BP Location:   RUE - Right upper extremity RUE - Right upper extremity   Patient Position:   Semi-Brito's Semi-Brito's   Pulse: 76 99 94 (!) 104   Resp:    Temp: 97.5 °F (36.4 °C)  98.1 °F (36.7 °C) 98.6 °F (37 °C)   TempSrc: Oral  Oral Oral   SpO2: 99%  100% 99%   Weight:       Height:         Objective:  Gen: NAD, cooperative  Head: NC/AT  Eyes: mild pallor   ENT: OP clear, MMM     Neck: supple, no thyromegaly, no LAD  CV: irregular   Lungs: CTA b/l, No wheezes or crackles appreciated  Abd: Soft, NT, ND, +BS, no HSM, no masses  Ext: Warm to touch,   Vascular: DP +2 in b/l UE/LE  MS: no joint deformity, ROM intact  Neuro: CN II-XII grossly Intact, no focal deficits  Derm: No rashes or skin lesions   Psych: Normal  affect   Perineum : saldana           Intake/Output Summary (Last 24 hours) at 2023 0704  Last data filed at 2023 0600  Gross per 24 hour   Intake --    Output 1350 ml   Net -1350 ml       Lab/Data Reviewed:  Recent Labs   Lab 04/17/23  0608 04/16/23  0640 04/15/23  0804 04/14/23  0607 04/13/23  0213 04/12/23  2325 04/12/23  1725   CO2 23 25 24   < >  --    < > 27   BUN 89* 95* 95*   < >  --    < > 119*   CREATININE 3.72* 3.89* 3.72*   < >  --    < > 4.38*   CALCIUM 8.2* 8.0* 8.1*   < >  --    < > 8.2*   PHOS  --   --   --   --  3.9  --  3.3   ALBUMIN 1.8* 1.8* 1.9*  --   --   --   --    AST 23 18 20  --   --   --   --    GLUCOSE 205* 213* 183*   < >  --    < > 194*    < > = values in this interval not displayed.     Recent Labs   Lab 04/18/23  0622 04/17/23  0551 04/16/23  0640   WBC 13.6* 14.3* 12.6*   HGB 11.3* 10.8* 11.1*   HCT 35.4* 33.7* 34.9*    455* 424         Recent Labs   Lab 04/18/23  0622 04/17/23  0608 04/16/23  0640 04/12/23  0529 04/11/23  1612   INR 2.0 1.7 1.7   < > 8.7*   PTT  --   --   --   --  60*    < > = values in this interval not displayed.           Assessment/Plan      # Acute Kidney Injury  CKD Stage IV - acute due to volume depletion from Vomiting and Osmotic diuresis   Expect Improvement with IV fluids   Chronic due to Diabetic Nephropathy   Goal is to prevent progression of kidney dx  No NSAIDS , no IV contrast   Hold Bumex   0.9 N saline 500ml bolus (4/18/23)  No acute volume or electrolyte indication for Dialysis      # L ureteral Stone   S/p cystoscopy , Urethral dilatation   Stent Placement (4/11/23)          #UTI - E fecalis  on cefepime      #HTN - Essential -   Marginally elevated  Cont Metoprolol ,         #DM - type II - with Nephropathy   Initially uncontrolled - Hyperosmolar  Improved with Insulin and IV fluids         # A.fib - with RVR -    Digoxin   Cont Metoprolol   Warfarin on Hold - INR supratherapeutic            # CAD - s/p NSTEMI -   S/p   cardiac CATH-  LAD PCI (5/26/22)   On ASA, Atorvastatin , Plavix        #Cardiomyopathy   Clinically compensated   Hold Bumex          #Hx CVA    Stable on ASA ,  Atorvastatin            #Hx gastric and prostate CA  S/p sx  Chemo                                        skin normal color for race, warm, dry and intact.

## 2023-05-27 ENCOUNTER — EMERGENCY (EMERGENCY)
Facility: HOSPITAL | Age: 48
LOS: 1 days | Discharge: LEFT BEFORE DISCHARGE | End: 2023-05-27
Attending: EMERGENCY MEDICINE
Payer: COMMERCIAL

## 2023-05-27 ENCOUNTER — EMERGENCY (EMERGENCY)
Facility: HOSPITAL | Age: 48
LOS: 1 days | Discharge: HOME | End: 2023-05-27
Attending: STUDENT IN AN ORGANIZED HEALTH CARE EDUCATION/TRAINING PROGRAM
Payer: COMMERCIAL

## 2023-05-27 VITALS
RESPIRATION RATE: 18 BRPM | OXYGEN SATURATION: 98 % | DIASTOLIC BLOOD PRESSURE: 74 MMHG | WEIGHT: 160.06 LBS | SYSTOLIC BLOOD PRESSURE: 116 MMHG | HEART RATE: 61 BPM | TEMPERATURE: 99 F

## 2023-05-27 VITALS
RESPIRATION RATE: 18 BRPM | HEART RATE: 62 BPM | WEIGHT: 169.98 LBS | OXYGEN SATURATION: 98 % | DIASTOLIC BLOOD PRESSURE: 74 MMHG | TEMPERATURE: 99 F | SYSTOLIC BLOOD PRESSURE: 112 MMHG

## 2023-05-27 VITALS
OXYGEN SATURATION: 98 % | TEMPERATURE: 99 F | SYSTOLIC BLOOD PRESSURE: 123 MMHG | RESPIRATION RATE: 18 BRPM | HEART RATE: 60 BPM | DIASTOLIC BLOOD PRESSURE: 68 MMHG

## 2023-05-27 PROCEDURE — 99283 EMERGENCY DEPT VISIT LOW MDM: CPT

## 2023-05-27 PROCEDURE — 99282 EMERGENCY DEPT VISIT SF MDM: CPT

## 2023-05-27 RX ORDER — KETOTIFEN FUMARATE 0.34 MG/ML
1 SOLUTION OPHTHALMIC ONCE
Refills: 0 | Status: DISCONTINUED | OUTPATIENT
Start: 2023-05-27 | End: 2023-06-04

## 2023-05-27 RX ORDER — ERYTHROMYCIN BASE 5 MG/GRAM
1 OINTMENT (GRAM) OPHTHALMIC (EYE) ONCE
Refills: 0 | Status: DISCONTINUED | OUTPATIENT
Start: 2023-05-27 | End: 2023-06-04

## 2023-05-27 NOTE — ED PROVIDER NOTE - PATIENT PORTAL LINK FT
You can access the FollowMyHealth Patient Portal offered by [facility] by registering at the following website: [email or web address]. By joining [employer]’s FollowMyHealth portal, you will also be able to view your health information using other applications (apps) compatible with our system.

## 2023-05-27 NOTE — ED PROVIDER NOTE - ATTENDING APP SHARED VISIT CONTRIBUTION OF CARE
I, Monalisa Monroe MD, have reviewed the ACP's documentation. After personally examining the patient, getting an independent history, and formulating an MDM, my findings have been added /edited to this documentation as indicated.

## 2023-05-27 NOTE — ED PROVIDER NOTE - CLINICAL SUMMARY MEDICAL DECISION MAKING FREE TEXT BOX
47 yr old F presented to ED for eye irritation and itching. Pt states that she lives at a shelter and believes that another resident at the shelter got her eye infected. Pt denies any chest pain , SOB or difficulty breathing. Pt says that she just want to be treated for her eye this time. HEENT: Normal findings, Eyes : PERRLA, EOMI , Nares  clear and Throat : WNL  Lungs: Clear B/L with good air entry  CVS: S1-S2 , with no murmurs  Abd : Normal BS, with no tenderness or organomegaly  Ext: Normal findings 47 yr old F presented to ED for eye irritation and itching. Pt states that she lives at a shelter and believes that another resident at the shelter got her eye infected. Pt denies any chest pain , SOB or difficulty breathing. Pt says that she just want to be treated for her eye this time. HEENT: Normal findings, Eyes : PERRLA, EOMI , Nares  clear and Throat : WNL  Lungs: Clear B/L with good air entry  CVS: S1-S2 , with no murmurs  Abd : Normal BS, with no tenderness or organomegaly  Ext: Normal findings    will stain with fluorescein to eval for abrasion, otherwise possible viral vs allergic conjunctivitis. no concern for periorbital or orbital cellulitis

## 2023-05-27 NOTE — ED ADULT NURSE NOTE - OBJECTIVE STATEMENT
pt reports bilateral eye redness - denies any eye discharge, fevers, and blurry vision.  eye drop and eye ointment given. dc instructions provided.

## 2023-05-27 NOTE — ED PROVIDER NOTE - CLINICAL SUMMARY MEDICAL DECISION MAKING FREE TEXT BOX
47 yr old F presented to ED for eye irritation and itching. Pt states that she lives at a shelter and believes that another resident at the shelter got her eye infected. Pt states that she also needs permethrin for a [place] but that she does not remember. Pt denies any chest pain , SOB or difficulty breathing  HEENT: Normal findings, Eyes : PERRLA, EOMI , Nares clear and Throat : WNL  Lungs: Clear B/L with good air entry  CVS: S1-S2 , with no murmurs  Abd : Normal BS, with no tenderness or organomegaly  Eye: + slight redness noted , no discharged   Ext: Normal findings

## 2023-05-27 NOTE — ED PROVIDER NOTE - NSFOLLOWUPINSTRUCTIONS_ED_ALL_ED_FT
Continue with medication as prescribed   Followup with [facility] as discussed

## 2023-05-27 NOTE — ED PROVIDER NOTE - OBJECTIVE STATEMENT
47 yr old F presented to ED for eye irritation and itching. Pt states that she lives at a shelter and believes that another resident at the shelter got her eye infected. Pt states that she also needs permethrin for a [place] but that she does not remember. Pt denies any chest pain , SOB or difficulty breathing. Pt denies any other issues at this time.

## 2023-05-27 NOTE — ED PROVIDER NOTE - OBJECTIVE STATEMENT
47 yr old F presented to ED for eye irritation and itching. Pt states that she lives at a shelter and believes that another resident at the shelter got her eye infected. Pt denies any chest pain , SOB or difficulty breathing. Pt says that she just want to be treated for her eye this time. Pt denies any other issues at this time. 47 yr old F presented to ED for eye irritation and itching. Pt states that she lives at a shelter and believes that another resident at the shelter got her eye infected. Pt denies any chest pain , SOB or difficulty breathing. Pt says that she just want to be treated for her eye this time. Pt denies any other issues at this time. States she believed she may have scratched her eye

## 2023-05-27 NOTE — ED ADULT NURSE NOTE - IS THE PATIENT ABLE TO BE SCREENED?
I called mom to check on Judy Isbell. Dr. Tho Live is able to see him on August 1 and they decided not to go to the emergency room. I will follow-up with her after that. Yes

## 2023-05-27 NOTE — ED PROVIDER NOTE - CARDIAC, MLM
[facility] Normal rate, regular rhythm.  Heart sounds S1, S2.  No murmurs, rubs or gallops.

## 2023-05-27 NOTE — ED ADULT NURSE NOTE - NSFALLUNIVINTERV_ED_ALL_ED
Bed/Stretcher in lowest position, wheels locked, appropriate side rails in place/Call bell, personal items and telephone in reach/Instruct patient to call for assistance before getting out of bed/chair/stretcher/Non-slip footwear applied when patient is off stretcher/[place] to call system/Physically safe environment - no spills, clutter or unnecessary equipment/Purposeful proactive rounding/Room/bathroom lighting operational, light cord in reach

## 2023-05-27 NOTE — ED ADULT TRIAGE NOTE - CHIEF COMPLAINT QUOTE
pt c/o eye pain, she cut all her eyelashes , she uses shampoo, now she is having eye redness and itchiness. pt states to have rashes all over body called "chigoe flee" caught while staying in shelter, pt is acting very bizarre in triage, talking to self .

## 2023-05-28 PROCEDURE — 99282 EMERGENCY DEPT VISIT SF MDM: CPT

## 2023-10-18 ENCOUNTER — EMERGENCY (EMERGENCY)
Facility: HOSPITAL | Age: 48
LOS: 1 days | Discharge: LEFT BEFORE DISCHARGE | End: 2023-10-18
Payer: MEDICAID

## 2023-10-18 VITALS
SYSTOLIC BLOOD PRESSURE: 123 MMHG | WEIGHT: 166.45 LBS | OXYGEN SATURATION: 98 % | DIASTOLIC BLOOD PRESSURE: 78 MMHG | TEMPERATURE: 98 F | HEART RATE: 70 BPM | HEIGHT: 63 IN | RESPIRATION RATE: 18 BRPM

## 2023-10-18 PROCEDURE — L9991: CPT

## 2023-10-18 NOTE — ED ADULT TRIAGE NOTE - CHIEF COMPLAINT QUOTE
Pt with bilateral eye pain for 1 month. Seen at a clinic in [place] about 10 days ago, given a steroid eye drop, pt taking with no relief. Pt denies vision changes, eye trauma.

## 2023-10-18 NOTE — ED ADULT NURSE NOTE - CHIEF COMPLAINT QUOTE
Pt with bilateral eye pain for 1 month. Seen at a clinic in [place] about 10 days ago, given a steroid eye drop, pt taking with no relief. Pt denies vision changes, eye trauma.

## 2023-11-21 NOTE — ED ADULT NURSE NOTE - FINAL NURSING ELECTRONIC SIGNATURE
I sent some sulfa.
Pt called said she is going to the bathroom every 45 minutes all night long and all day. Just goes a little bit and it also burns. She is requesting something be called in today. Said she is needing some kind of relief. Said she is miserable.       [employer]
Pt informed.
Pt informed. She called checking to see if anything was able to be called in for her. She stated that the issue started when she started getting injections in her eye and the doctor told her that she was not able to take cranberry pills anymore due to them being a herb. She said ever since she has been getting UTIs. She said she knows that she has one because she did OTC test and it came back positive. She is asking for something to be called in. She does not want to go over the holiday without anything.     Naman Wayne may be reached at [phone]
Pt's son brought in a urine today due to possible infection. Son wanted to let you know pt is not bathing hardly at all due to her being unable to step over the bathtub and be stable enough to shower she feels. Pt just does sponge baths.     He ask if you feel this could be part of her issue with recurrent UTI's?
There is a son named Ko Peggy on [employer]. You could let him know that her not getting showers could be the cause of the utis but there could be other reasons too.
08-Nov-2019 05:56

## 2023-11-30 ENCOUNTER — EMERGENCY (EMERGENCY)
Facility: HOSPITAL | Age: 48
LOS: 1 days | Discharge: HOME | End: 2023-11-30
Attending: EMERGENCY MEDICINE
Payer: COMMERCIAL

## 2023-11-30 VITALS
HEIGHT: 64 IN | OXYGEN SATURATION: 97 % | WEIGHT: 161.38 LBS | SYSTOLIC BLOOD PRESSURE: 117 MMHG | HEART RATE: 72 BPM | RESPIRATION RATE: 18 BRPM | TEMPERATURE: 99 F | DIASTOLIC BLOOD PRESSURE: 76 MMHG

## 2023-11-30 LAB
APPEARANCE UR: CLEAR — SIGNIFICANT CHANGE UP
APPEARANCE UR: CLEAR — SIGNIFICANT CHANGE UP
BACTERIA # UR AUTO: NEGATIVE /HPF — SIGNIFICANT CHANGE UP
BACTERIA # UR AUTO: NEGATIVE /HPF — SIGNIFICANT CHANGE UP
BILIRUB UR-MCNC: NEGATIVE — SIGNIFICANT CHANGE UP
BILIRUB UR-MCNC: NEGATIVE — SIGNIFICANT CHANGE UP
CAST: 0 /LPF — SIGNIFICANT CHANGE UP (ref 0–4)
CAST: 0 /LPF — SIGNIFICANT CHANGE UP (ref 0–4)
COLOR SPEC: YELLOW — SIGNIFICANT CHANGE UP
COLOR SPEC: YELLOW — SIGNIFICANT CHANGE UP
DIFF PNL FLD: NEGATIVE — SIGNIFICANT CHANGE UP
DIFF PNL FLD: NEGATIVE — SIGNIFICANT CHANGE UP
GLUCOSE UR QL: NEGATIVE MG/DL — SIGNIFICANT CHANGE UP
GLUCOSE UR QL: NEGATIVE MG/DL — SIGNIFICANT CHANGE UP
KETONES UR-MCNC: ABNORMAL MG/DL
KETONES UR-MCNC: ABNORMAL MG/DL
LEUKOCYTE ESTERASE UR-ACNC: NEGATIVE — SIGNIFICANT CHANGE UP
LEUKOCYTE ESTERASE UR-ACNC: NEGATIVE — SIGNIFICANT CHANGE UP
NITRITE UR-MCNC: NEGATIVE — SIGNIFICANT CHANGE UP
NITRITE UR-MCNC: NEGATIVE — SIGNIFICANT CHANGE UP
PH UR: 5.5 — SIGNIFICANT CHANGE UP (ref 5–8)
PH UR: 5.5 — SIGNIFICANT CHANGE UP (ref 5–8)
PROT UR-MCNC: NEGATIVE MG/DL — SIGNIFICANT CHANGE UP
PROT UR-MCNC: NEGATIVE MG/DL — SIGNIFICANT CHANGE UP
RBC CASTS # UR COMP ASSIST: 2 /HPF — SIGNIFICANT CHANGE UP (ref 0–4)
RBC CASTS # UR COMP ASSIST: 2 /HPF — SIGNIFICANT CHANGE UP (ref 0–4)
SP GR SPEC: 1.03 — SIGNIFICANT CHANGE UP (ref 1–1.03)
SP GR SPEC: 1.03 — SIGNIFICANT CHANGE UP (ref 1–1.03)
SQUAMOUS # UR AUTO: 2 /HPF — SIGNIFICANT CHANGE UP (ref 0–5)
SQUAMOUS # UR AUTO: 2 /HPF — SIGNIFICANT CHANGE UP (ref 0–5)
UROBILINOGEN FLD QL: 1 MG/DL — SIGNIFICANT CHANGE UP (ref 0.2–1)
UROBILINOGEN FLD QL: 1 MG/DL — SIGNIFICANT CHANGE UP (ref 0.2–1)
WBC UR QL: 0 /HPF — SIGNIFICANT CHANGE UP (ref 0–5)
WBC UR QL: 0 /HPF — SIGNIFICANT CHANGE UP (ref 0–5)

## 2023-11-30 PROCEDURE — 81001 URINALYSIS AUTO W/SCOPE: CPT

## 2023-11-30 PROCEDURE — 99284 EMERGENCY DEPT VISIT MOD MDM: CPT

## 2023-11-30 PROCEDURE — 87086 URINE CULTURE/COLONY COUNT: CPT

## 2023-11-30 PROCEDURE — 99283 EMERGENCY DEPT VISIT LOW MDM: CPT

## 2023-11-30 RX ORDER — FLUCONAZOLE 150 MG/1
1 TABLET ORAL
Qty: 4 | Refills: 0
Start: 2023-11-30 | End: 2023-12-27

## 2023-11-30 NOTE — ED PROVIDER NOTE - PROGRESS NOTE DETAILS
Estinfa- Patient called to state the pharmacy would not fill the prescription due to the provider not being a medicaid provider. I resent the medication for the patient.

## 2023-11-30 NOTE — ED PROVIDER NOTE - SKIN, MLM
Skin normal color for race, warm, dry and intact. Left hand palm: area of redness and blanchness with fluctuance. nontender

## 2023-11-30 NOTE — ED PROVIDER NOTE - CLINICAL SUMMARY MEDICAL DECISION MAKING FREE TEXT BOX
Pt is a 46yo female with no significant PMH presenting with left hand discoloration. Pt noticed it first 2-3 months ago, pt notes that it is pale and feels stiff in the palmar affected region, has been getting worse and now cannot make a fist due to stiffness. Pt has tried peroxide, bleach, compound w, clomitrazole, and was prescribed cephalexin 2 weeks ago which did not improve the rash. Pt denies any fever, decreased sensation, decreased ROM. Left hand palm: area of redness and blanchness with fluctuance. nontender. Pt lives in shelter. Pt is a 48yo female with no significant PMH presenting with left hand discoloration. Pt noticed it first 2-3 months ago, pt notes that it is pale and feels stiff in the palmar affected region, has been getting worse and now cannot make a fist due to stiffness. Pt has tried peroxide, bleach, compound w, clomitrazole, and was prescribed cephalexin 2 weeks ago which did not improve the rash. Pt denies any fever, decreased sensation, decreased ROM. Left hand palm: area of redness and blanchness with fluctuance. nontender. Pt lives in shelter. Pt is a 48yo female with no significant PMH presenting with left hand discoloration. Pt noticed it first 2-3 months ago, pt notes that it is pale and feels stiff in the palmar affected region, has been getting worse and now cannot make a fist due to stiffness. Pt has tried peroxide, bleach, compound w, clomitrazole, and was prescribed cephalexin 2 weeks ago which did not improve the rash. Pt denies any fever, decreased sensation, decreased ROM. Left hand palm: multiple satellite areas of redness with overlying blanchness with fluctuance. nontender. Based on presentation and timing, consistent with tinea corporis. Pt prescribed antifungals. Pt complains of burning with urination, urine studies sent for possible UTI. Pt is a 46yo female with no significant PMH presenting with left hand discoloration. Pt noticed it first 2-3 months ago, pt notes that it is pale and feels stiff in the palmar affected region, has been getting worse and now cannot make a fist due to stiffness. Pt has tried peroxide, bleach, compound w, clomitrazole, and was prescribed cephalexin 2 weeks ago which did not improve the rash. Pt denies any fever, decreased sensation, decreased ROM. Left hand palm: multiple satellite areas of redness with overlying blanchness with fluctuance. nontender. Based on presentation and timing, consistent with tinea corporis. Pt prescribed antifungals. Pt complains of burning with urination, urine studies sent for possible UTI. Pt is a 46yo female with no significant PMH presenting with left hand discoloration. Pt noticed it first 2-3 months ago, pt notes that it is pale and feels stiff in the palmar affected region, has been getting worse and now cannot make a fist due to stiffness. Pt has tried peroxide, bleach, compound w, clomitrazole, and was prescribed cephalexin 2 weeks ago which did not improve the rash. Pt denies any fever, decreased sensation, decreased ROM. Left hand palm: multiple satellite areas of redness with overlying blanchness with fluctuance. nontender. Based on presentation and timing, consistent with tinea corporis. Pt prescribed antifungals. Pt complains of burning with urination, no CVA tenderness. urine studies sent for possible UTI. Pt is a 48yo female with no significant PMH presenting with left hand discoloration. Pt noticed it first 2-3 months ago, pt notes that it is pale and feels stiff in the palmar affected region, has been getting worse and now cannot make a fist due to stiffness. Pt has tried peroxide, bleach, compound w, clomitrazole, and was prescribed cephalexin 2 weeks ago which did not improve the rash. Pt denies any fever, decreased sensation, decreased ROM. Left hand palm: multiple satellite areas of redness with overlying blanchness with fluctuance. nontender. Based on presentation and timing, consistent with tinea corporis. Pt prescribed antifungals. Pt complains of burning with urination, no CVA tenderness. urine studies sent for possible UTI. Pt is a 46yo female with no significant PMH presenting with left hand discoloration. Pt noticed it first 2-3 months ago, pt notes that it is pale and feels stiff in the palmar affected region, has been getting worse and now cannot make a fist due to stiffness. Pt has tried peroxide, bleach, compound w, clomitrazole, and was prescribed cephalexin 2 weeks ago which did not improve the rash. Pt denies any fever, decreased sensation, decreased ROM. Left hand palm: multiple satellite areas of redness with overlying blanchness with fluctuance. nontender. Based on presentation and timing, consistent with tinea corporis. Pt prescribed antifungals. Pt complains of burning with urination and mild flank pain, unrelated. No CVA tenderness. urinalysis is negative for UTI. Pt is a 48yo female with no significant PMH presenting with left hand discoloration. Pt noticed it first 2-3 months ago, pt notes that it is pale and feels stiff in the palmar affected region, has been getting worse and now cannot make a fist due to stiffness. Pt has tried peroxide, bleach, compound w, clomitrazole, and was prescribed cephalexin 2 weeks ago which did not improve the rash. Pt denies any fever, decreased sensation, decreased ROM. Left hand palm: multiple satellite areas of redness with overlying blanchness with fluctuance. nontender. Based on presentation and timing, consistent with tinea corporis. Pt prescribed antifungals. Pt complains of burning with urination and mild flank pain, unrelated. No CVA tenderness. urinalysis is negative for UTI.

## 2023-11-30 NOTE — ED ADULT TRIAGE NOTE - CHIEF COMPLAINT QUOTE
pt c/o issue with left palm, left palm discolored, does not know what happen, noticed it 2 months getting worse  A&Ox3, resp wnl, denies pain

## 2023-11-30 NOTE — ED PROVIDER NOTE - NSFOLLOWUPINSTRUCTIONS_ED_ALL_ED_FT
Tinea corporis, also called ringworm, is a skin infection caused by a fungus. It usually affects the skin on the face, chest, or limbs. Tinea corporis is most common in children and athletes.    Please take fluconazole once a week for 4 weeks. Please keep area dry, and wash gently with soap and water.    Call your doctor if:  You have a fever.  Your rash continues to spread after 7 days of treatment.  Your rash is not gone within 2 weeks.  The area around your sore becomes more red, warm, tender, swollen, or smells bad.    Please return to the ED for any new or worsening symptoms, including fever, chills, loss of sensation or decreased strength in the hand.

## 2023-11-30 NOTE — ED ADULT NURSE NOTE - OBJECTIVE STATEMENT
Pt A&Ox4, NAD. Pt presents to the ED with complaints of left hand pain. Breathing even and unlabored.

## 2023-11-30 NOTE — ED PROVIDER NOTE - NS ED ATTENDING STATEMENT MOD
This was a shared visit with the LEYDI. I reviewed and verified the documentation and independently performed the documented: Attending with

## 2023-11-30 NOTE — ED PROVIDER NOTE - ATTENDING CONTRIBUTION TO CARE
Pt with several weeks with palmar rash, slowly growing.  no loss of function.  rash is cw fungal.  pt has tried topicals, will change to orals

## 2023-11-30 NOTE — ED PROVIDER NOTE - OBJECTIVE STATEMENT
Pt is a 46yo female with no significant PMH presenting with left hand discoloration. Pt noticed it first 2-3 months ago, pt notes that it is pale and feels stiff in the palmar affected region, has been getting worse and now cannot make a fist due to stiffness. Pt has tried peroxide, bleach, compound w, clomitrazole, and was prescribed cephalexin 2 weeks ago which did not improve the rash. Pt denies any fever, decreased sensation, decreased ROM. Pt is a 48yo female with no significant PMH presenting with left hand discoloration. Pt noticed it first 2-3 months ago, pt notes that it is pale and feels stiff in the palmar affected region, has been getting worse and now cannot make a fist due to stiffness. Pt has tried peroxide, bleach, compound w, clomitrazole, and was prescribed cephalexin 2 weeks ago which did not improve the rash. Pt denies any fever, decreased sensation, decreased ROM.

## 2023-11-30 NOTE — ED PROVIDER NOTE - PATIENT PORTAL LINK FT
You can access the FollowMyHealth Patient Portal offered by [facility] by registering at the following website: [email or web address]. By joining [employer]’s FollowMyHealth portal, you will also be able to view your health information using other applications (apps) compatible with our system. You can access the FollowMyHealth Patient Portal offered by [facility] by registering at the following website: [email or web address]. By joining [employer]’s FollowMyHealth portal, you will also be able to view your health information using other applications (apps) compatible with our system. You can access the FollowMyHealth Patient Portal offered by [facility] by registering at the following website: [email or web address]. By joining [employer]’s FollowMyHealth portal, you will also be able to view your health information using other applications (apps) compatible with our system.

## 2023-12-02 LAB
CULTURE RESULTS: SIGNIFICANT CHANGE UP
CULTURE RESULTS: SIGNIFICANT CHANGE UP
SPECIMEN SOURCE: SIGNIFICANT CHANGE UP
SPECIMEN SOURCE: SIGNIFICANT CHANGE UP

## 2023-12-02 NOTE — ED ADULT NURSE NOTE - NS ED NURSE DISCH DISPOSITION
PT to the ED with complaint of chest pain. PT states pain woke her up out of her sleep. PT states pain  feels like weight sitting on her chest. PT notes her last chemo treatment was last Tuesday at [facility] in [place]. Respirations are unlabored. PT states she has been occasionally having these pains while on chemo.
Improved
Discharged

## 2024-01-01 NOTE — ED PROVIDER NOTE - NSFOLLOWUPINSTRUCTIONS_ED_ALL_ED_FT
Patient education: Dermatitis (The Basics)  View in Armenian  Written by the doctors and editors at [facility]  What is dermatitis?  Dermatitis is a type of skin rash that can happen after your skin touches something that irritates it or something you are allergic to.    Things that irritate the skin can be found in products you use every day, such as soaps or cleansers. Some of the things that can cause skin allergies include:    ?Certain medicines, perfumes, or cosmetics    ?The metal in some kinds of jewelry    ?Plants, such as poison ivy and poison oak    Sometimes you can develop a rash the first time you touch something. But it is also possible to get a rash from something you have used before without any problems.    What other symptoms should I watch for?  If you have a rash, your skin might be red, dry, itchy, or cracked (picture 1). If your rash is caused by an allergy, you might also have some swelling or blisters where you have the rash.    Severe symptoms include:    ?Pain    ?Widespread swelling    ?Blisters, oozing, or crusting of the skin    What can I do to get rid of my dermatitis?  You can:    ?Avoid using or touching whatever might have caused your rash    ?Protect your skin from anything that might irritate it or cause an allergy. For example, wear gloves if you need to work with harsh soaps.    ?Try using soothing skin products to help with the itching and discomfort. Things that might help include:    •Unscented, thick moisturizing cream or petroleum jelly    •A special kind of bath called an oatmeal bath    Should I see a doctor or nurse?  See your doctor or nurse if your rash does not go away within 2 weeks, or if it gets worse. Your doctor can help figure out what could be causing your rash.    How are skin rashes treated?  Your doctor might prescribe different treatments or medicines to help your rash. These can include:    ?Steroid creams and ointments – These are not the same as the steroids some athletes take illegally. They go on the skin, and they relieve itching and redness.    ?Steroid pills – You might need to take these for a short time if your rash is severe. But your doctor or nurse will want to take you off steroid pills as soon as possible. Even though these medicines help, they can also cause problems of their own.    ?Wet or damp dressings – These can be helpful for skin that is crusting or oozing. To use a wet or damp dressing, you will need to wear 2 layers of clothing. First, you put on a layer of damp cotton clothes over your rash. Then, you put on a layer of dry clothes on top of the damp ones. People who need these dressings often wear them at night when they sleep.
No

## 2024-08-06 NOTE — ED STATDOCS - RESPIRATORY, MLM
[FreeTextEntry1] : Chief complaint: dyspepsia, abdominal pain   HPI: Patient presents for evaluation and management abdominal pain. Patient with acute onset of llq abdominal  pain whith a crampy character. There are no exacerbating or ameliorating factors. the abdominal pian is non radiating. Associated factors include nausea.  Patient also with constiopation  I reviewed patient's bloodwork report  I reviewed patient's abdominal x ray   I sent in a prescription for antibiotics to pharmacy  low residue diet reviewed with patient  I scheduled a new test for the patient, colonoscopy
breath sounds clear and equal bilaterally.

## 2024-08-06 NOTE — ED ADULT TRIAGE NOTE - PATIENT ON (OXYGEN DELIVERY METHOD)
room air What Type Of Note Output Would You Prefer (Optional)?: Bullet Format Hpi Title: Evaluation of Skin Lesions

## 2024-09-18 NOTE — ED ADULT NURSE NOTE - BREATH SOUNDS, MLM
[facility] - Surgery (2nd Fl)  Discharge Note  Short Stay    Procedure(s) (LRB):  Bronchoscopy (N/A)      OUTCOME: Patient tolerated treatment/procedure well without complication and is now ready for discharge.    DISPOSITION: Home or Self Care    FINAL DIAGNOSIS:  <principal problem not specified>    FOLLOWUP: In clinic    DISCHARGE INSTRUCTIONS:    Discharge Procedure Orders   Diet general        TIME SPENT ON DISCHARGE: 5 minutes   Clear

## 2024-10-14 NOTE — ED ADULT TRIAGE NOTE - NS ED NURSE BANDS TYPE
"CHIEF COMPLAINT:   Chief Complaint   Patient presents with    Work Comp       HISTORY OF PRESENTING INJURY     Sanjeev is a pleasant 55 year old male here for follow-up of a left rib injury, reportedly sustained at the workplace on 10/9/2024.  History as given to [facility] is confirmed:    \"States 2 days ago he was standing on the tongue of the trailer attached to his work truck when the endgate of the truck fell open hitting him in the side of the left posterior ribs.\"    X-rays were obtained that showed two healing fractures. He denies pre-existing trauma except for old injury approximately 10 years ago when he fractured ribs on that side.    He is having a lot of spasm, but notes improvement after the last few days, moving more around his home.  He is wearing a lumbar stabilizing support brace and this is helping him as well. Denies any sensory changes or motor weakness.  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         PAST MEDICAL HISTORY:  Past Medical History:   Diagnosis Date    Osteoarthritis     Hx of injuries from gymnastics       PAST SURGICAL HISTORY:  Past Surgical History:   Procedure Laterality Date    ELBOW SURGERY  2011    OSTEOTOMY FEMUR DISTAL      2004 last,Right knee surgery X 3, last one work comp,anterior cruciate tear and meniscus problems    right total knee surgery  2013    dr dietrich       ALLERGIES:  Allergies   Allergen Reactions    Hydrocodone-Acetaminophen Anaphylaxis      changed    Ultram [Tramadol] Anaphylaxis      changed    Seasonal Allergies Other (See Comments)     Runny nose, itchy eyes       CURRENT MEDICATIONS:  Current Outpatient Medications   Medication Sig Dispense Refill    acetaminophen (TYLENOL) 325 MG tablet Take 2 tablets (650 mg) by mouth daily      acetaminophen-codeine (TYLENOL #3) 300-30 MG per tablet Take 1 tablet by mouth every 6 hours as needed for severe pain. 10 tablet 0 "    Ascorbic Acid (VITAMIN C) 500 MG CAPS Take 1,000 mg by mouth daily.      aspirin (ASA) 325 MG tablet Take 325 mg by mouth daily with food      escitalopram (LEXAPRO) 10 MG tablet       fluocinonide (LIDEX) 0.05 % external cream       fluticasone (FLONASE) 50 MCG/ACT nasal spray Spray 2 sprays into both nostrils daily 16 g 11    meloxicam (MOBIC) 7.5 MG tablet Take 1 tablet (7.5 mg) by mouth daily as needed for moderate pain. 40 tablet 1    multivitamin w/minerals (THERA-VIT-M) tablet Take 1 tablet by mouth daily      nitroGLYcerin (NITROSTAT) 0.4 MG sublingual tablet       Spacer/Aero-Holding Chambers (AEROCHAMBER WITH MOUTHPIECE) MISC       VENTOLIN  (90 Base) MCG/ACT inhaler          SOCIAL HISTORY:  Social History     Socioeconomic History    Marital status:      Spouse name: Not on file    Number of children: Not on file    Years of education: Not on file    Highest education level: Not on file   Occupational History    Not on file   Tobacco Use    Smoking status: Never    Smokeless tobacco: Current     Types: Chew    Tobacco comments:     ocassionally chews   Substance and Sexual Activity    Alcohol use: Not Currently    Drug use: No    Sexual activity: Not Currently     Partners: Female     Comment: provider to address if needed   Other Topics Concern    Not on file   Social History Narrative    Tobacco-none at this point.  Alcohol-social.    He is .  PCA, previous EMT    nonsmoker             Social Determinants of Health     Financial Resource Strain: Not on file   Food Insecurity: Not on file   Transportation Needs: Not on file   Physical Activity: Not on file   Stress: Not on file   Social Connections: Not on file   Interpersonal Safety: Low Risk  (10/14/2024)    Interpersonal Safety     Do you feel physically and emotionally safe where you currently live?: Yes     Within the past 12 months, have you been hit, slapped, kicked or otherwise physically hurt by someone?: No     Within  the past 12 months, have you been humiliated or emotionally abused in other ways by your partner or ex-partner?: No   Housing Stability: Not on file       FAMILY HISTORY:  Family History   Problem Relation Age of Onset    Cerebral aneurysm Mother     Cerebrovascular Disease Father 68    Heart Disease Brother 68    Heart Disease Brother 66    Chronic Obstructive Pulmonary Disease Brother     Brain Hemorrhage Brother         bike accident    Other - See Comments No family hx of         No hx of malignant hypothermia    Diabetes No family hx of     Colon Cancer No family hx of     Prostate Cancer No family hx of        REVIEW OF SYSTEMS:    Unremarkable other than chief complaint      PHYSICAL EXAM:   /83   Pulse 71   Resp 16   Ht 1.829 m (6')   Wt 89.4 kg (197 lb)   SpO2 98%   BMI 26.72 kg/m   Body mass index is 26.72 kg/m . General Appearance: No acute distress.  Full motion and strength testing of upper and lower extremities.     X-rays were reviewed and discussed with patient:      Study Result    Narrative & Impression   XR RIBS AND CHEST LEFT 3 VIEWS, 10/11/2024 10:58 AM     History: Male, age 55 years; Fall against object; Rib pain on left  side; Shortness of breath     Comparison: Chest x-ray 9/26/2020     Technique: Single view of the chest and three views of the left ribs.     FINDINGS: The cardiac silhouette is within normal limits. The  pulmonary vasculature is within normal limits. Evaluation of the ribs  demonstrates irregularity involving the costochondral junction of the  left ninth rib                                                                      IMPRESSION:   Healing fracture suggested at the left ninth and seventh rib  costochondral junction      No pneumothorax.     HAROLDO MIDDLETON MD          IMPRESSION/PLAN:    There is question on causality given the healing findings at the involved ribs on the left thoracic cage on x-ray.  It is within a reasonable degree of medical certainty  Name band; that his ribs were previously fractured and in the process of healing when this injury occurred, and the injury further aggravated the condition.      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  He does meet DOT driving requirements.    Follow up in 4 weeks with new x-ray and updated workability.    Total time spent today in chart review/preparation, face to face evaluation, and documentation: 27 minutes.      Jordi Trejo DC, GEORGI, CICE  Director - Occupational Medicine Department  Diplomate of the American Board of Forensic Professionals  Board Certified - American Board of Independent Medical Examiners      9:54 AM 10/14/2024

## 2025-01-17 ENCOUNTER — EMERGENCY (EMERGENCY)
Facility: HOSPITAL | Age: 50
LOS: 1 days | Discharge: LEFT BEFORE DISCHARGE | End: 2025-01-17
Attending: STUDENT IN AN ORGANIZED HEALTH CARE EDUCATION/TRAINING PROGRAM
Payer: MEDICAID

## 2025-01-17 VITALS
HEART RATE: 59 BPM | TEMPERATURE: 98 F | RESPIRATION RATE: 20 BRPM | DIASTOLIC BLOOD PRESSURE: 77 MMHG | OXYGEN SATURATION: 96 % | SYSTOLIC BLOOD PRESSURE: 113 MMHG | WEIGHT: 178.57 LBS

## 2025-01-17 PROCEDURE — L9991: CPT

## 2025-01-17 NOTE — ED ADULT TRIAGE NOTE - CHIEF COMPLAINT QUOTE
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  Per patient, she has been using Bacitracin with minimal improvement.

## 2025-01-17 NOTE — ED ADULT TRIAGE NOTE - MEANS OF ARRIVAL
ambulatory Valtrex Pregnancy And Lactation Text: this medication is Pregnancy Category B and is considered safe during pregnancy. This medication is not directly found in breast milk but it's metabolite acyclovir is present.

## 2025-01-18 ENCOUNTER — EMERGENCY (EMERGENCY)
Facility: HOSPITAL | Age: 50
LOS: 1 days | Discharge: HOME | End: 2025-01-18
Attending: STUDENT IN AN ORGANIZED HEALTH CARE EDUCATION/TRAINING PROGRAM
Payer: COMMERCIAL

## 2025-01-18 VITALS
OXYGEN SATURATION: 96 % | DIASTOLIC BLOOD PRESSURE: 87 MMHG | SYSTOLIC BLOOD PRESSURE: 123 MMHG | HEART RATE: 66 BPM | TEMPERATURE: 98 F | RESPIRATION RATE: 20 BRPM

## 2025-01-18 VITALS
OXYGEN SATURATION: 97 % | RESPIRATION RATE: 20 BRPM | SYSTOLIC BLOOD PRESSURE: 116 MMHG | DIASTOLIC BLOOD PRESSURE: 69 MMHG | HEART RATE: 64 BPM | TEMPERATURE: 98 F | WEIGHT: 179.68 LBS

## 2025-01-18 VITALS
HEART RATE: 51 BPM | OXYGEN SATURATION: 98 % | RESPIRATION RATE: 20 BRPM | SYSTOLIC BLOOD PRESSURE: 104 MMHG | TEMPERATURE: 98 F | DIASTOLIC BLOOD PRESSURE: 62 MMHG

## 2025-01-18 PROCEDURE — 99283 EMERGENCY DEPT VISIT LOW MDM: CPT

## 2025-01-18 PROCEDURE — 99284 EMERGENCY DEPT VISIT MOD MDM: CPT | Mod: 25

## 2025-01-18 RX ORDER — MUPIROCIN 2 %
1 OINTMENT (GRAM) TOPICAL ONCE
Refills: 0 | Status: COMPLETED | OUTPATIENT
Start: 2025-01-18 | End: 2025-01-18

## 2025-01-18 RX ORDER — MUPIROCIN 2 %
1 OINTMENT (GRAM) TOPICAL
Qty: 1 | Refills: 0
Start: 2025-01-18 | End: 2025-01-24

## 2025-01-18 RX ADMIN — Medication 1 APPLICATION(S): at 08:38

## 2025-01-18 NOTE — CHART NOTE - NSCHARTNOTEFT_GEN_A_CORE
SW note: Worker met with pt who reports she was staying at [address] [place] [address]. Worker and pt called [facility] housing and spoke with Charly Godinez. Charly reports pt lost her bed at housing and will need new placement. Charly to follow up with this worker once placement obtained. SW to remain available. SW note: Worker met with pt who reports she was staying at [address] [place] [address]. Worker and pt called [facility] and spoke with Charly Godinez. Charly reports pt lost her bed at [facility] and will need new placement. Charly to follow up with this worker once placement obtained.   9:44am- Worker received call from henry Parks placed at [address] [place] [address].   No further social work needs at this time.

## 2025-01-18 NOTE — ED PROVIDER NOTE - NS ED ROS FT
Constitutional: no fever  CV: no chest pain  Resp: no cough, no shortness of breath  GI: no abdominal pain, no vomiting, no diarrhea  : no dysuria  MSK: no joint pain  Neuro: no headache  Skin: +arm wounds

## 2025-01-18 NOTE — ED PROVIDER NOTE - PATIENT PORTAL LINK FT
You can access the FollowMyHealth Patient Portal offered by [facility] by registering at the following website: [email or web address]. By joining [employer]’s FollowMyHealth portal, you will also be able to view your health information using other applications (apps) compatible with our system.

## 2025-01-18 NOTE — ED ADULT NURSE NOTE - OBJECTIVE STATEMENT
Patient triaged earlier and placed in Iso 2.  When nurse approached the room patient was no where to be found.  Patient now returns to ED stating that something happened at her shelter and she needed to leave to take care of it.  Patient returns with no new complaints.  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  Per patient, she has been using Bacitracin with minimal improvement. DSS to be notified when patient is discharged to arrange for shelter placement since patient was discharged from shelter secondary to missing her curfew last night.  [facility] [phone]

## 2025-01-18 NOTE — ED ADULT TRIAGE NOTE - MEANS OF ARRIVAL
Kenalog Preparation: Kenalog
Medical Necessity Clause: This procedure was medically necessary because the lesions that were treated were:
Detail Level: Detailed
Total Volume Injected (Ccs- Only Use Numbers And Decimals): 2
Administered By (Optional): Nicole Glischinski PA-C
Include Z78.9 (Other Specified Conditions Influencing Health Status) As An Associated Diagnosis?: No
Consent: The risks of atrophy were reviewed with the patient.
Concentration Of Solution Injected (Mg/Ml): 40.0
X Size Of Lesion In Cm (Optional): 0
ambulatory

## 2025-01-18 NOTE — ED ADULT TRIAGE NOTE - CHIEF COMPLAINT QUOTE
Patient triaged earlier and placed in Iso 2.  When nurse approached the room patient was no where to be found.  Patient now returns to ED stating that something happened at her shelter and she needed to leave to take care of it.  Patient returns with no new complaints.  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  Per patient, she has been using Bacitracin with minimal improvement. DSS to be notified when patient is discharged to arrange for shelter placement since patient was discharged from shelter secondary to missing her curfew last night.  [facility] [phone]

## 2025-01-18 NOTE — ED PROVIDER NOTE - PHYSICAL EXAMINATION
Constitutional: Awake, alert, in no acute distress  Eyes: no scleral icterus  HENT: normocephalic, atraumatic, moist oral mucosa  Neck: supple  CV: RRR  Pulm: non-labored respirations  Extremities: no edema, no deformity  Skin: +few superficial abrasions to left arm, no crusting or discharge, no fluctuance, no crepitus, no surrounding erythema or streaking  Neuro: AAOx3, moving all extremities equally

## 2025-01-18 NOTE — ED PROVIDER NOTE - CLINICAL SUMMARY MEDICAL DECISION MAKING FREE TEXT BOX
Pt presents for superficial wounds to arm. No signs of superimposed infection at this time. Will cover with topical Bactroban. Stable for discharge.

## 2025-01-18 NOTE — ED ADULT NURSE NOTE - CHIEF COMPLAINT QUOTE
Patient triaged earlier and placed in Iso 2.  When nurse approached the room patient was no where to be found.  Patient now returns to ED stating that something happened at her shelter and she needed to leave to take care of it.  Patient returns with no new complaints.  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  Per patient, she has been using Bacitracin with minimal improvement. DSS to be notified when patient is discharged to arrange for shelter placement since patient was discharged from shelter secondary to missing her curfew last night.  [facility] [phone]

## 2025-01-18 NOTE — ED PROVIDER NOTE - OBJECTIVE STATEMENT
49y F w/ no significant PMH presents for arm wounds. Pt endorses prior history of parasite infection "chigoes" few years ago and since then has had chronic wounds to her extremities. Over the past few days has developed increased irritation to left arm wounds that she attributes to her clothes rubbing on her skin. No fever, itching, purulent drainage. Pt requesting topical antibiotic.

## 2025-01-18 NOTE — ED ADULT TRIAGE NOTE - PRO INTERPRETER NEED 2
"  English
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
## 2025-01-24 DIAGNOSIS — S40.812A ABRASION OF LEFT UPPER ARM, INITIAL ENCOUNTER: ICD-10-CM
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
## 2025-02-19 NOTE — ED ADULT NURSE NOTE - SUICIDE SCREENING QUESTION 3
Chief Complaint   Patient presents with    COMPREHENSIVE EYE EXAM         Last Eye Exam: 11/22/11  Dilated Previously: Yes    What are you currently using to see?  Glasses for Near, also uses OTC readers, uses rom +1.75's up to about +2.25's        Distance Vision Acuity: Satisfied with vision, no changes or issues     Near Vision Acuity: Satisfied with vision while reading and using computer with readers and with glasses. Uses Rx glasses mostly at hime, she doesn't want to lose them     Eye Comfort: good and dry at times   Do you use eye drops? : Yes: Has recently started using Systane   Occupation or Hobbies: Retired, Sews, Knitting     Amiarh Apple Optometric Assistant           Medical, surgical and family histories reviewed and updated 2/19/2025.       OBJECTIVE: See Ophthalmology exam    ASSESSMENT:    ICD-10-CM    1. Vision exam with abnormal findings  Z01.01       2. Presbyopia  H52.4       3. Nuclear sclerotic cataract of both eyes- mild  H25.13           PLAN:     Patient Instructions   Fill glasses prescription as needed  Allow 2 weeks to adapt to change in glasses  Return in 1 year for eye exam    Krystal Bone O.D.   Electronically Signed    [facility]  [address] [address], [place] [id number] [phone]       No

## 2025-02-25 NOTE — ED PROVIDER NOTE - NS_EDPROVIDERDISPOUSERTYPE_ED_A_ED
Attending Attestation (For Attendings USE Only)...
Adequate: hears normal conversation without difficulty

## 2025-06-30 ENCOUNTER — EMERGENCY (EMERGENCY)
Facility: HOSPITAL | Age: 50
LOS: 0 days | Discharge: HOME | End: 2025-06-30
Attending: EMERGENCY MEDICINE
Payer: MEDICAID

## 2025-06-30 VITALS
DIASTOLIC BLOOD PRESSURE: 79 MMHG | HEART RATE: 75 BPM | WEIGHT: 185.19 LBS | RESPIRATION RATE: 17 BRPM | TEMPERATURE: 99 F | OXYGEN SATURATION: 96 % | SYSTOLIC BLOOD PRESSURE: 118 MMHG

## 2025-06-30 DIAGNOSIS — M79.644 PAIN IN RIGHT FINGER(S): ICD-10-CM

## 2025-06-30 PROCEDURE — 99284 EMERGENCY DEPT VISIT MOD MDM: CPT

## 2025-06-30 PROCEDURE — 99283 EMERGENCY DEPT VISIT LOW MDM: CPT | Mod: 25

## 2025-06-30 PROCEDURE — 73130 X-RAY EXAM OF HAND: CPT | Mod: 26,RT

## 2025-06-30 PROCEDURE — 73130 X-RAY EXAM OF HAND: CPT | Mod: RT

## 2025-06-30 RX ORDER — ACETAMINOPHEN 500 MG/5ML
650 LIQUID (ML) ORAL ONCE
Refills: 0 | Status: COMPLETED | OUTPATIENT
Start: 2025-06-30 | End: 2025-06-30

## 2025-06-30 RX ADMIN — Medication 650 MILLIGRAM(S): at 17:27

## 2025-06-30 NOTE — ED STATDOCS - PROGRESS NOTE DETAILS
Dr. Miranda ED attending- I was informed by nursing staff that pt has been speaking to herself, muttering " I am not an experiment" and there are multiple triage notes concerned for erratic behavior in the waiting room. XR shows no acute fx or dislocation. No documented psych hx. In setting of possible psychosis 1:1 and medical clearance for psych consult ordered, nursing staff made aware, will sign out to Dr. Brannon pt to be moved to [facility]: pt currently being reevaluated by Dr. Brannon

## 2025-06-30 NOTE — ED PROVIDER NOTE - PATIENT PORTAL LINK FT
You can access the FollowMyHealth Patient Portal offered by [facility] by registering at the following website: [email or web address]. By joining [employer]’s FollowMyHealth portal, you will also be able to view your health information using other applications (apps) compatible with our system.

## 2025-06-30 NOTE — ED ADULT NURSE NOTE - HIV OFFER
Pt stable. Afebrile. Tolerating PO well. Voiding and stooling. BM this shift. There is urgency and incontinence with voiding. Urine is yellow/clear in color. Pt up to playroom and ambulated in the hallway today with her mother. PIV 24 G. R. Hand, saline locked, CDI. Only Cefepime infusing Q8H through IV. No PRN meds given this shift. Pt's mother at the bedside.Plan of care reviewed with the pt's mother. Understanding verbalized. Will continue to monitor.    Previously Declined (within the last year)

## 2025-06-30 NOTE — ED ADULT NURSE REASSESSMENT NOTE - NS ED NURSE REASSESS COMMENT FT1
pt called multiple times for triage, pt was no where to be found. Pt reentered into ER WR from outside while this RN was triaging other patients angry and asking if she needs to wait now. Pt noted to continue to leave ER and come back while waiting to be seen by MD

## 2025-06-30 NOTE — ED ADULT NURSE NOTE - OBJECTIVE STATEMENT
pt presents to the ED for pain to her finger. pt then reports the pain is in her hand, when asked which pt says right, then notes it is the left. reports it is her pointer finger and she is unable to move it, pt noted to be moving it at this time without difficulty. no other complaints or discomforts reported at this time

## 2025-06-30 NOTE — ED ADULT NURSE REASSESSMENT NOTE - NS ED NURSE REASSESS COMMENT FT1
pt noted to be speaking to self and making minimal sense. asking multiple staff members how long it takes to read an Xray stating its taking too long - pt updated on process. medicated as per orders - see emar.

## 2025-06-30 NOTE — ED PROVIDER NOTE - CLINICAL SUMMARY MEDICAL DECISION MAKING FREE TEXT BOX
Ddx: No evidence of psychosis presently  Plan: reassurance, plan for d/c as was already discussed w patient.

## 2025-06-30 NOTE — ED PROVIDER NOTE - OBJECTIVE STATEMENT
Pt is a 48 yo lady with a pmhx of cocaine use who presents to the ED with R. finger pain and was waiting for xray when she was noticed to be talking to herself per nursing. Patient brought back for further evaluation. Patient says she just saw other patients with bags of fluid, and was just remarking that she wouldn't want that for herself. Otherwise, she says she denies any Si/ Hi, or AVH, and brought herself to ED for evaluation. Says ' yeah, I'm a little weird, but it's [place], I can be who I want to be.' Pt is alert and oriented, not acting on internal stimuli, and declines speaking with psychiatry.

## 2025-06-30 NOTE — ED ADULT NURSE REASSESSMENT NOTE - NS ED NURSE REASSESS COMMENT FT1
pt began to become more agitated, noted to be speaking to self and talking to walls with inappropriate speech. denies drug or alcohol use. denies SI/HI. STEPHANIE Springer and MD Miranda updated on situation and pt upgraded for proper evaluation due to erratic behavior without noted psych history. ANM made aware. code gray called. pt independently walked to  followed by staff.

## 2025-06-30 NOTE — ED ADULT NURSE REASSESSMENT NOTE - NS ED NURSE REASSESS COMMENT FT1
Code gray on a pt in intake costa for bizarre behaviour, pt to 1 on 1:1, KRYSTA and security bedside, MD Brannon bedside for assessment, pt denies SI/HI or any psychotic thoughts at this time, as per MD pt to be dc home. No need for psych workup at this time. Pt agreeable.

## 2025-06-30 NOTE — ED STATDOCS - OBJECTIVE STATEMENT
49 year old female with no pertinent PMHx presents to ED c/o right index finger pain. patient states that she noticed a lump on her finger about a week ago, and the pain to the area was exacerbated after lifting a heavy bag. patient denies any trauma or punctures to the finger. patient reports some difficulty ranging the finger secondary to pain.

## 2025-06-30 NOTE — ED STATDOCS - CLINICAL SUMMARY MEDICAL DECISION MAKING FREE TEXT BOX
presentation most concerning for callous of right index finger, possible sprain. no signs of infection. plan: x-ray imaging, monitor and reassessment. likely discharge home with hand followup if no acute findings on x-ray presentation most concerning for callous of right index finger, possible sprain. no signs of infection. plan: x-ray imaging, monitor and reassessment. likely discharge home with hand followup if no acute findings on x-ray. XR shows no acute actionable findings. Will move to Main ER for possible psych eval

## 2025-06-30 NOTE — ED ADULT NURSE NOTE - BIRTH SEX
Female Metronidazole Counseling:  I discussed with the patient the risks of metronidazole including but not limited to seizures, nausea/vomiting, a metallic taste in the mouth, nausea/vomiting and severe allergy.

## 2025-06-30 NOTE — ED STATDOCS - PHYSICAL EXAMINATION
Constitutional: well appearing, NAD AAOx3  Eyes: EOMI, PERRL  Head: Normocephalic atraumatic  Mouth: no airway obstruction, posterior oropharynx clear without erythema or exudate  Neck: supple  Cardiac: regular rate and rhythm, no MRG  Resp: Lungs CTAB  GI: Abd s/nt/nd  Neuro: CN2-12 intact, strength 5/5x4, sensation grossly intact  Skin: No rashes,   Msk: right index finger with tenderness over the lateral aspect of the DIP without skin changes, swelling, + normal ROM, normal capillary refill., sensation intact throughout.

## 2025-07-29 NOTE — ED ADULT NURSE NOTE - CHIEF COMPLAINT QUOTE
Please get repeat labs drawn in 2 weeks. We will call you with results.   
Ambulatory reporting 3 days of atraumatic R wrist pain. Patient has not medicated for pain. No redness/swelling to the area.

## 2025-08-09 ENCOUNTER — EMERGENCY (EMERGENCY)
Facility: HOSPITAL | Age: 50
LOS: 1 days | End: 2025-08-09
Attending: STUDENT IN AN ORGANIZED HEALTH CARE EDUCATION/TRAINING PROGRAM
Payer: COMMERCIAL

## 2025-08-09 VITALS
TEMPERATURE: 98 F | RESPIRATION RATE: 18 BRPM | DIASTOLIC BLOOD PRESSURE: 67 MMHG | HEART RATE: 96 BPM | SYSTOLIC BLOOD PRESSURE: 98 MMHG | OXYGEN SATURATION: 97 %

## 2025-08-09 LAB
ALBUMIN SERPL ELPH-MCNC: 4.2 G/DL — SIGNIFICANT CHANGE UP (ref 3.3–5.2)
ALP SERPL-CCNC: 78 U/L — SIGNIFICANT CHANGE UP (ref 40–120)
ALT FLD-CCNC: 18 U/L — SIGNIFICANT CHANGE UP
ANION GAP SERPL CALC-SCNC: 18 MMOL/L — HIGH (ref 5–17)
AST SERPL-CCNC: 18 U/L — SIGNIFICANT CHANGE UP
BASOPHILS # BLD AUTO: 0.02 K/UL — SIGNIFICANT CHANGE UP (ref 0–0.2)
BASOPHILS # BLD MANUAL: 0.06 K/UL — SIGNIFICANT CHANGE UP (ref 0–0.2)
BASOPHILS NFR BLD AUTO: 0.3 % — SIGNIFICANT CHANGE UP (ref 0–2)
BASOPHILS NFR BLD MANUAL: 0.9 % — SIGNIFICANT CHANGE UP (ref 0–2)
BILIRUB SERPL-MCNC: 0.6 MG/DL — SIGNIFICANT CHANGE UP (ref 0.4–2)
BUN SERPL-MCNC: 19.8 MG/DL — SIGNIFICANT CHANGE UP (ref 8–20)
CALCIUM SERPL-MCNC: 9.2 MG/DL — SIGNIFICANT CHANGE UP (ref 8.4–10.5)
CHLORIDE SERPL-SCNC: 98 MMOL/L — SIGNIFICANT CHANGE UP (ref 96–108)
CO2 SERPL-SCNC: 20 MMOL/L — LOW (ref 22–29)
CREAT SERPL-MCNC: 0.85 MG/DL — SIGNIFICANT CHANGE UP (ref 0.5–1.3)
EGFR: 83 ML/MIN/1.73M2 — SIGNIFICANT CHANGE UP
EGFR: 83 ML/MIN/1.73M2 — SIGNIFICANT CHANGE UP
EOSINOPHIL # BLD AUTO: 0.01 K/UL — SIGNIFICANT CHANGE UP (ref 0–0.5)
EOSINOPHIL # BLD MANUAL: 0 K/UL — SIGNIFICANT CHANGE UP (ref 0–0.5)
EOSINOPHIL NFR BLD AUTO: 0.2 % — SIGNIFICANT CHANGE UP (ref 0–6)
EOSINOPHIL NFR BLD MANUAL: 0 % — SIGNIFICANT CHANGE UP (ref 0–6)
FLUAV AG NPH QL: SIGNIFICANT CHANGE UP
FLUBV AG NPH QL: SIGNIFICANT CHANGE UP
GLUCOSE SERPL-MCNC: 124 MG/DL — HIGH (ref 70–99)
HCT VFR BLD CALC: 45.2 % — HIGH (ref 34.5–45)
HGB BLD-MCNC: 15.5 G/DL — SIGNIFICANT CHANGE UP (ref 11.5–15.5)
IMM GRANULOCYTES # BLD AUTO: 0.03 K/UL — SIGNIFICANT CHANGE UP (ref 0–0.07)
IMM GRANULOCYTES NFR BLD AUTO: 0.5 % — SIGNIFICANT CHANGE UP (ref 0–0.9)
LIDOCAIN IGE QN: 25 U/L — SIGNIFICANT CHANGE UP (ref 22–51)
LYMPHOCYTES # BLD AUTO: 0.45 K/UL — LOW (ref 1–3.3)
LYMPHOCYTES # BLD MANUAL: 0.79 K/UL — LOW (ref 1–3.3)
LYMPHOCYTES NFR BLD AUTO: 6.9 % — LOW (ref 13–44)
LYMPHOCYTES NFR BLD MANUAL: 12.2 % — LOW (ref 13–44)
MANUAL METAMYELOCYTE #: 0.17 K/UL — HIGH (ref 0–0)
MANUAL NEUTROPHIL BANDS #: 0.46 K/UL — SIGNIFICANT CHANGE UP (ref 0–0.84)
MCHC RBC-ENTMCNC: 27.8 PG — SIGNIFICANT CHANGE UP (ref 27–34)
MCHC RBC-ENTMCNC: 34.3 G/DL — SIGNIFICANT CHANGE UP (ref 32–36)
MCV RBC AUTO: 81 FL — SIGNIFICANT CHANGE UP (ref 80–100)
METAMYELOCYTES # FLD: 2.6 % — HIGH (ref 0–0)
METAMYELOCYTES NFR BLD: 2.6 % — HIGH (ref 0–0)
MONOCYTES # BLD AUTO: 0.5 K/UL — SIGNIFICANT CHANGE UP (ref 0–0.9)
MONOCYTES # BLD MANUAL: 0.4 K/UL — SIGNIFICANT CHANGE UP (ref 0–0.9)
MONOCYTES NFR BLD AUTO: 7.7 % — SIGNIFICANT CHANGE UP (ref 2–14)
MONOCYTES NFR BLD MANUAL: 6.1 % — SIGNIFICANT CHANGE UP (ref 2–14)
NEUTROPHILS # BLD AUTO: 5.49 K/UL — SIGNIFICANT CHANGE UP (ref 1.8–7.4)
NEUTROPHILS # BLD MANUAL: 4.63 K/UL — SIGNIFICANT CHANGE UP (ref 1.8–7.4)
NEUTROPHILS NFR BLD AUTO: 84.4 % — HIGH (ref 43–77)
NEUTROPHILS NFR BLD MANUAL: 71.2 % — SIGNIFICANT CHANGE UP (ref 43–77)
NEUTS BAND # BLD: 7 % — SIGNIFICANT CHANGE UP (ref 0–8)
NEUTS BAND NFR BLD: 7 % — SIGNIFICANT CHANGE UP (ref 0–8)
NRBC # BLD AUTO: 0 K/UL — SIGNIFICANT CHANGE UP (ref 0–0)
NRBC # FLD: 0 K/UL — SIGNIFICANT CHANGE UP (ref 0–0)
NRBC BLD AUTO-RTO: 0 /100 WBCS — SIGNIFICANT CHANGE UP (ref 0–0)
PLAT MORPH BLD: NORMAL — SIGNIFICANT CHANGE UP
PLATELET # BLD AUTO: 243 K/UL — SIGNIFICANT CHANGE UP (ref 150–400)
PMV BLD: 11.7 FL — SIGNIFICANT CHANGE UP (ref 7–13)
POTASSIUM SERPL-MCNC: 3.9 MMOL/L — SIGNIFICANT CHANGE UP (ref 3.5–5.3)
POTASSIUM SERPL-SCNC: 3.9 MMOL/L — SIGNIFICANT CHANGE UP (ref 3.5–5.3)
PROT SERPL-MCNC: 7 G/DL — SIGNIFICANT CHANGE UP (ref 6.6–8.7)
RBC # BLD: 5.58 M/UL — HIGH (ref 3.8–5.2)
RBC # FLD: 12.2 % — SIGNIFICANT CHANGE UP (ref 10.3–14.5)
RBC BLD AUTO: NORMAL — SIGNIFICANT CHANGE UP
RSV RNA NPH QL NAA+NON-PROBE: SIGNIFICANT CHANGE UP
SARS-COV-2 RNA SPEC QL NAA+PROBE: SIGNIFICANT CHANGE UP
SODIUM SERPL-SCNC: 136 MMOL/L — SIGNIFICANT CHANGE UP (ref 135–145)
SOURCE RESPIRATORY: SIGNIFICANT CHANGE UP
WBC # BLD: 6.5 K/UL — SIGNIFICANT CHANGE UP (ref 3.8–10.5)
WBC # FLD AUTO: 6.5 K/UL — SIGNIFICANT CHANGE UP (ref 3.8–10.5)

## 2025-08-09 PROCEDURE — 83690 ASSAY OF LIPASE: CPT

## 2025-08-09 PROCEDURE — 82962 GLUCOSE BLOOD TEST: CPT

## 2025-08-09 PROCEDURE — 80053 COMPREHEN METABOLIC PANEL: CPT

## 2025-08-09 PROCEDURE — 71046 X-RAY EXAM CHEST 2 VIEWS: CPT

## 2025-08-09 PROCEDURE — 71046 X-RAY EXAM CHEST 2 VIEWS: CPT | Mod: 26

## 2025-08-09 PROCEDURE — 87637 SARSCOV2&INF A&B&RSV AMP PRB: CPT

## 2025-08-09 PROCEDURE — 36415 COLL VENOUS BLD VENIPUNCTURE: CPT

## 2025-08-09 PROCEDURE — 85025 COMPLETE CBC W/AUTO DIFF WBC: CPT

## 2025-08-09 PROCEDURE — 99285 EMERGENCY DEPT VISIT HI MDM: CPT

## 2025-08-09 RX ORDER — DEXAMETHASONE 0.5 MG/1
8 TABLET ORAL ONCE
Refills: 0 | Status: COMPLETED | OUTPATIENT
Start: 2025-08-09 | End: 2025-08-09

## 2025-08-09 RX ORDER — DEXAMETHASONE 0.5 MG/1
8 TABLET ORAL ONCE
Refills: 0 | Status: DISCONTINUED | OUTPATIENT
Start: 2025-08-09 | End: 2025-08-09

## 2025-08-09 RX ORDER — AMOXICILLIN AND CLAVULANATE POTASSIUM 500; 125 MG/1; MG/1
1 TABLET, FILM COATED ORAL
Qty: 14 | Refills: 0
Start: 2025-08-09 | End: 2025-08-15

## 2025-08-09 RX ORDER — KETOROLAC TROMETHAMINE 30 MG/ML
30 INJECTION, SOLUTION INTRAMUSCULAR; INTRAVENOUS ONCE
Refills: 0 | Status: DISCONTINUED | OUTPATIENT
Start: 2025-08-09 | End: 2025-08-09

## 2025-08-09 RX ORDER — AZITHROMYCIN 250 MG
1 CAPSULE ORAL
Qty: 4 | Refills: 0
Start: 2025-08-09 | End: 2025-08-12

## 2025-08-09 RX ORDER — SODIUM CHLORIDE 9 G/1000ML
1000 INJECTION, SOLUTION INTRAVENOUS ONCE
Refills: 0 | Status: COMPLETED | OUTPATIENT
Start: 2025-08-09 | End: 2025-08-09

## 2025-08-09 RX ORDER — ALBUTEROL SULFATE 2.5 MG/3ML
2 VIAL, NEBULIZER (ML) INHALATION EVERY 6 HOURS
Refills: 0 | Status: DISCONTINUED | OUTPATIENT
Start: 2025-08-09 | End: 2025-08-17

## 2025-08-09 RX ORDER — AZITHROMYCIN 250 MG
500 CAPSULE ORAL ONCE
Refills: 0 | Status: COMPLETED | OUTPATIENT
Start: 2025-08-09 | End: 2025-08-09

## 2025-08-09 RX ORDER — AMOXICILLIN AND CLAVULANATE POTASSIUM 500; 125 MG/1; MG/1
1 TABLET, FILM COATED ORAL ONCE
Refills: 0 | Status: DISCONTINUED | OUTPATIENT
Start: 2025-08-09 | End: 2025-08-09

## 2025-08-09 RX ORDER — DEXTROMETHORPHAN HBR, GUAIFENESIN 200 MG/10ML
200 LIQUID ORAL ONCE
Refills: 0 | Status: COMPLETED | OUTPATIENT
Start: 2025-08-09 | End: 2025-08-09

## 2025-08-09 RX ORDER — ONDANSETRON HCL/PF 4 MG/2 ML
4 VIAL (ML) INJECTION ONCE
Refills: 0 | Status: COMPLETED | OUTPATIENT
Start: 2025-08-09 | End: 2025-08-09

## 2025-08-09 RX ADMIN — KETOROLAC TROMETHAMINE 30 MILLIGRAM(S): 30 INJECTION, SOLUTION INTRAMUSCULAR; INTRAVENOUS at 23:54

## 2025-08-09 RX ADMIN — SODIUM CHLORIDE 1000 MILLILITER(S): 9 INJECTION, SOLUTION INTRAVENOUS at 23:36

## 2025-08-09 RX ADMIN — DEXAMETHASONE 101.6 MILLIGRAM(S): 0.5 TABLET ORAL at 22:03

## 2025-08-09 RX ADMIN — Medication 500 MILLIGRAM(S): at 23:53

## 2025-08-09 RX ADMIN — DEXTROMETHORPHAN HBR, GUAIFENESIN 200 MILLIGRAM(S): 200 LIQUID ORAL at 22:04

## 2025-08-09 RX ADMIN — Medication 4 MILLIGRAM(S): at 22:03

## 2025-08-10 VITALS
OXYGEN SATURATION: 96 % | TEMPERATURE: 98 F | RESPIRATION RATE: 18 BRPM | DIASTOLIC BLOOD PRESSURE: 73 MMHG | HEART RATE: 59 BPM | SYSTOLIC BLOOD PRESSURE: 115 MMHG

## 2025-08-10 LAB
APPEARANCE UR: CLEAR — SIGNIFICANT CHANGE UP
BACTERIA # UR AUTO: ABNORMAL /HPF
BILIRUB UR-MCNC: NEGATIVE — SIGNIFICANT CHANGE UP
CAST: 2 /LPF — SIGNIFICANT CHANGE UP (ref 0–4)
COLOR SPEC: YELLOW — SIGNIFICANT CHANGE UP
DIFF PNL FLD: ABNORMAL
GLUCOSE UR QL: NEGATIVE MG/DL — SIGNIFICANT CHANGE UP
KETONES UR QL: NEGATIVE MG/DL — SIGNIFICANT CHANGE UP
LEUKOCYTE ESTERASE UR-ACNC: NEGATIVE — SIGNIFICANT CHANGE UP
NITRITE UR-MCNC: NEGATIVE — SIGNIFICANT CHANGE UP
PH UR: 6 — SIGNIFICANT CHANGE UP (ref 5–8)
PROT UR-MCNC: SIGNIFICANT CHANGE UP MG/DL
RBC CASTS # UR COMP ASSIST: 1 /HPF — SIGNIFICANT CHANGE UP (ref 0–4)
SP GR SPEC: 1.01 — SIGNIFICANT CHANGE UP (ref 1–1.03)
SQUAMOUS # UR AUTO: 2 /HPF — SIGNIFICANT CHANGE UP (ref 0–5)
UROBILINOGEN FLD QL: 0.2 MG/DL — SIGNIFICANT CHANGE UP (ref 0.2–1)
WBC UR QL: 3 /HPF — SIGNIFICANT CHANGE UP (ref 0–5)

## 2025-08-10 PROCEDURE — 87637 SARSCOV2&INF A&B&RSV AMP PRB: CPT

## 2025-08-10 PROCEDURE — 85025 COMPLETE CBC W/AUTO DIFF WBC: CPT

## 2025-08-10 PROCEDURE — 82962 GLUCOSE BLOOD TEST: CPT

## 2025-08-10 PROCEDURE — 71046 X-RAY EXAM CHEST 2 VIEWS: CPT

## 2025-08-10 PROCEDURE — 81001 URINALYSIS AUTO W/SCOPE: CPT

## 2025-08-10 PROCEDURE — 36415 COLL VENOUS BLD VENIPUNCTURE: CPT

## 2025-08-10 PROCEDURE — 99284 EMERGENCY DEPT VISIT MOD MDM: CPT | Mod: 25

## 2025-08-10 PROCEDURE — 83690 ASSAY OF LIPASE: CPT

## 2025-08-10 PROCEDURE — 94640 AIRWAY INHALATION TREATMENT: CPT

## 2025-08-10 PROCEDURE — 96375 TX/PRO/DX INJ NEW DRUG ADDON: CPT

## 2025-08-10 PROCEDURE — 80053 COMPREHEN METABOLIC PANEL: CPT

## 2025-08-10 PROCEDURE — 96374 THER/PROPH/DIAG INJ IV PUSH: CPT

## 2025-08-10 RX ORDER — DEXTROMETHORPHAN HBR, GUAIFENESIN 200 MG/10ML
100 LIQUID ORAL ONCE
Refills: 0 | Status: COMPLETED | OUTPATIENT
Start: 2025-08-10 | End: 2025-08-10

## 2025-08-10 RX ORDER — AMOXICILLIN AND CLAVULANATE POTASSIUM 500; 125 MG/1; MG/1
1 TABLET, FILM COATED ORAL
Qty: 14 | Refills: 0
Start: 2025-08-10 | End: 2025-08-16

## 2025-08-10 RX ADMIN — Medication 2 PUFF(S): at 00:02

## 2025-08-10 RX ADMIN — DEXTROMETHORPHAN HBR, GUAIFENESIN 100 MILLIGRAM(S): 200 LIQUID ORAL at 10:08

## 2025-08-11 RX ORDER — AZITHROMYCIN 250 MG
1 CAPSULE ORAL
Qty: 4 | Refills: 0
Start: 2025-08-11 | End: 2025-08-14